# Patient Record
Sex: FEMALE | Race: WHITE | Employment: UNEMPLOYED | URBAN - NONMETROPOLITAN AREA
[De-identification: names, ages, dates, MRNs, and addresses within clinical notes are randomized per-mention and may not be internally consistent; named-entity substitution may affect disease eponyms.]

---

## 2019-11-13 ENCOUNTER — OFFICE VISIT (OUTPATIENT)
Dept: FAMILY MEDICINE | Facility: OTHER | Age: 39
End: 2019-11-13
Payer: MEDICAID

## 2019-11-13 ENCOUNTER — TELEPHONE (OUTPATIENT)
Dept: FAMILY MEDICINE | Facility: CLINIC | Age: 39
End: 2019-11-13

## 2019-11-13 VITALS
HEART RATE: 102 BPM | TEMPERATURE: 98.3 F | HEIGHT: 61 IN | WEIGHT: 129.19 LBS | OXYGEN SATURATION: 100 % | RESPIRATION RATE: 20 BRPM | BODY MASS INDEX: 24.39 KG/M2 | DIASTOLIC BLOOD PRESSURE: 80 MMHG | SYSTOLIC BLOOD PRESSURE: 132 MMHG

## 2019-11-13 DIAGNOSIS — F17.200 TOBACCO USE DISORDER: ICD-10-CM

## 2019-11-13 DIAGNOSIS — J06.9 UPPER RESPIRATORY TRACT INFECTION, UNSPECIFIED TYPE: Primary | ICD-10-CM

## 2019-11-13 LAB
FLUAV+FLUBV AG SPEC QL: NEGATIVE
FLUAV+FLUBV AG SPEC QL: NEGATIVE
SPECIMEN SOURCE: NORMAL

## 2019-11-13 PROCEDURE — 87804 INFLUENZA ASSAY W/OPTIC: CPT | Performed by: FAMILY MEDICINE

## 2019-11-13 PROCEDURE — 99203 OFFICE O/P NEW LOW 30 MIN: CPT | Performed by: FAMILY MEDICINE

## 2019-11-13 RX ORDER — CODEINE PHOSPHATE AND GUAIFENESIN 10; 100 MG/5ML; MG/5ML
2 SOLUTION ORAL EVERY 6 HOURS PRN
Qty: 120 ML | Refills: 0 | Status: SHIPPED | OUTPATIENT
Start: 2019-11-13 | End: 2020-01-09

## 2019-11-13 RX ORDER — CETIRIZINE HYDROCHLORIDE 10 MG/1
10 TABLET ORAL DAILY
Qty: 30 TABLET | Refills: 0 | Status: SHIPPED | OUTPATIENT
Start: 2019-11-13 | End: 2020-03-26

## 2019-11-13 RX ORDER — ALBUTEROL SULFATE 90 UG/1
2 AEROSOL, METERED RESPIRATORY (INHALATION) EVERY 4 HOURS PRN
Qty: 1 INHALER | Refills: 0 | Status: SHIPPED | OUTPATIENT
Start: 2019-11-13 | End: 2020-01-09

## 2019-11-13 ASSESSMENT — PAIN SCALES - GENERAL: PAINLEVEL: NO PAIN (0)

## 2019-11-13 ASSESSMENT — MIFFLIN-ST. JEOR: SCORE: 1198.37

## 2019-11-13 NOTE — TELEPHONE ENCOUNTER
Silvia Samaniego MD [facility]             Please let patient know that her flu test was negative.  Most likely she had viral infection and therefore antibiotic is not needed.  I sent a prescription of the inhaler, Zyrtec and cough medication to the pharmacy and please take them as prescribed.  Encouraged to drink a lot of water.  Strongly encouraged to stop smoking and recommend to follow-up for physical exam at her earliest convenience.  Cough medication is codeine therefore it can be sedated.  Encouraged her not to operate any type of machine when she is taking the codeine.  To the ER if develop breathing comfortably breathing difficulty.

## 2019-11-13 NOTE — PROGRESS NOTES
Subjective     Marily Fabian is a 39 year old female who presents to clinic today for the following health issues:    HPI   RESPIRATORY SYMPTOMS      Duration: 4 days     Description  nasal congestion, rhinorrhea, sore throat, cough, wheezing, chills, headache, fatigue/malaise and Ear fullness and dzzzyness    Severity: moderate    Accompanying signs and symptoms: None    History (predisposing factors):  none    Precipitating or alleviating factors: None    Therapies tried and outcome:  oral decongestant    Marily is here today for 4 day history is of cold symptoms.  Stated that she has not seen a provider for more than 10 years.  Been a smoker for many years; smoke about 1 ppd.  Stated that 4 days ago, she started having the sore throat followed by runny nose, nasal congestion, and coughing.  The ST is getting better but the runny nose and coughing are getting worse.  Cough is productive with clear and greenish production.  Cough is worse at night, it kept her up mot of the night last night.  Been having a runny nose and nasal congestion but no sneezing.  A lot of pressure in her ear without pain or drainage.  Also been having on and off frontal headache.  No sinus pain or pressure.  Stated that she has been wheezing with coughing and exertion.  Denied of CP or chest tightness sensation.  She feels little shortness of breath with exertion or coughing.  A lot of bodyache and having feeling hot and cold.  No nausea, vomiting, diarrhea constipation.  Been drinking a lot of water.  No history of asthma or COPD that she knows of.  Did not receive the flu vaccination for this season and is not up-to-date for the Tdap.  Stated that relative had URI symptoms over the weekend.  No other concerns.    No current outpatient medications on file.     Allergies   Allergen Reactions     Clarithromycin      biaxin     Ibuprofen        Reviewed and updated as needed this visit by Provider         Review of Systems   ROS COMP:  "Constitutional, HEENT, cardiovascular, pulmonary, gi and gu systems are negative, except as otherwise noted.      Objective    /80 (BP Location: Left arm, Patient Position: Sitting, Cuff Size: Adult Regular)   Pulse 102   Temp 98.3  F (36.8  C) (Temporal)   Resp 20   Ht 1.549 m (5' 1\")   Wt 58.6 kg (129 lb 3 oz)   LMP 11/05/2019   SpO2 100%   Breastfeeding No   BMI 24.41 kg/m    Body mass index is 24.41 kg/m .  Physical Exam   GENERAL: healthy, alert and no distress.  Speak in full sentences. Anxious  HENT: ear canals and TM's normal.  Nares are congested with clear drainage.  Oropharynx is pink and moist.  No tonsilar redness, exudate or hypertrophy.  No tender with palpation to the sinuses.  NECK: no adenopathy, supple, no lymphadenopathy.  RESP: Decreased breath sounds throughout with no wheezing or crackles.  No retraction or in respiratory distress.  CV: regular rate and rhythm, no murmur.  No leg swelling.  ABDOMEN: soft, non-tender and bowel sounds normal      Diagnostic Test Results:  Labs reviewed in Epic  Results for orders placed or performed in visit on 11/13/19   Influenza A/B antigen     Status: None   Result Value Ref Range    Influenza A/B Agn Specimen Nasal     Influenza A Negative NEG^Negative    Influenza B Negative NEG^Negative           Assessment & Plan       ICD-10-CM    1. Upper respiratory tract infection, unspecified type J06.9 Influenza A/B antigen   2. Tobacco use disorder F17.200      aMrily is here today for 4 day history of cold symptoms including runny nose, nasal congestion, sore throat, coughing with body aches.  She smokes a pack a day.  No known history of COPD or asthma.  Did not get her flu vaccination.  Vital sign was stable with O2 sat 100% at room air.  Did not look acutely sick in the office.  Physical exam with significant decreased breath sounds throughout with no wheezing or crackles.  Not in acute distress.  While waiting for the flu test, patient got " upset and left the room.  I tried to calm her down and have her to wait for flu test result but she determined to leave. I was planning to check RST if flu test was negative.  Clinical presentation and physical exam did not suggest of strep pharyngitis.  She refused to wait and ran out of the room.    The flu vaccination was negative.  Most likely to have a viral infection and antibiotic is not indicated.  Patient was informed about the lab results over the phone.  Started her with albuterol inhaler, Zyrtec and Robitussin-AC.  She was strongly encouraged to stop smoking.  Recommended to follow up if the symptoms are not getting better or getting worse in the next 3 to 4 days.  ER if develop breathing difficulty.  She was also encouraged to follow-up for physical/preventive care.  This message was relayed to the patient over the phone by nursing staff.      Tobacco Cessation:   reports that she has been smoking cigarettes. She has a 24.00 pack-year smoking history. She has never used smokeless tobacco.  Tobacco Cessation Action Plan: Information offered: Patient not interested at this time      Return in about 1 month (around 12/13/2019) for Physical Exam - with PCP.    Silvia Enrique Mai, MD  [facility]

## 2019-11-13 NOTE — Clinical Note
Please let patient know that her flu test was negative.  Most likely she had viral infection and therefore antibiotic is not needed.  I sent a prescription of the inhaler, Zyrtec and cough medication to the pharmacy and please take them as prescribed.  Encouraged to drink a lot of water.  Strongly encouraged to stop smoking and recommend to follow-up for physical exam at her earliest convenience.  Cough medication is codeine therefore it can be sedated.  Encouraged her not to operate any type of machine when she is taking the codeine.  To the ER if develop breathing comfortably breathing difficulty.

## 2019-12-12 ENCOUNTER — TELEPHONE (OUTPATIENT)
Dept: FAMILY MEDICINE | Facility: CLINIC | Age: 39
End: 2019-12-12

## 2019-12-12 NOTE — TELEPHONE ENCOUNTER
Panel Management Review      Patient has the following on her problem list: None      Composite cancer screening  Chart review shows that this patient is due/due soon for the following Pap Smear  Summary:    Patient is due/failing the following:   PAP and PHYSICAL    Action needed:   Patient needs office visit for physical.    Type of outreach:    Sent letter.    Questions for provider review:    None                                                                                                                                    Kira Nielsen CMA        Chart routed to Care Team .

## 2019-12-12 NOTE — LETTER
[address] [address]  [phone]        December 12, 2019    Marily Fabian  [address] [facility] [address]          Dear Marily,    We were looking through you chart and noticed that you haven't been seen in a while, we would like to schedule an office visit for an annual physical when you have time. Please give the clinic a call at [phone] and schedule at your earliest convienence.  Thank you so much, and we look forward to seeing you soon.             Sincerely,        Silvia Samaniego MD

## 2019-12-26 ENCOUNTER — TELEPHONE (OUTPATIENT)
Dept: FAMILY MEDICINE | Facility: OTHER | Age: 39
End: 2019-12-26

## 2019-12-26 NOTE — TELEPHONE ENCOUNTER
Reason for Call:  Same Day Appointment, Requested Provider:  ANY PROVIDER    PCP: No Ref-Primary, Physician    Reason for visit: sore throat/cough/runny nose/fever/wheezing    Duration of symptoms: 1 wk    Have you been treated for this in the past? No    Additional comments: pt has an appt tomorrow. Pt is very sick. Pts Aunt is wondering if a Provider could see pt today. Pts car broke down so not able [facility]    Can we leave a detailed message on this number? YES    Phone number patient can be reached at: [phone]    Best Time:     Call taken on 12/26/2019 at 9:41 AM by Nohemy Hager

## 2019-12-26 NOTE — TELEPHONE ENCOUNTER
Unfortunately, I do not have any openings at this time. I would recommend that the patient either be seen at [facility] or keep the appointment for tomorrow.     Sukh Qiu PA-C on 12/26/2019 at 12:43 PM

## 2019-12-26 NOTE — TELEPHONE ENCOUNTER
I called this patient with the following per Sukh Qiu PA-C:  Unfortunately, I do not have any openings at this time. I would recommend that the patient either be seen at [facility] care or keep the appointment for tomorrow. She stated she will keep her appointment tomorrow.

## 2020-01-09 ENCOUNTER — OFFICE VISIT (OUTPATIENT)
Dept: FAMILY MEDICINE | Facility: OTHER | Age: 40
End: 2020-01-09
Payer: COMMERCIAL

## 2020-01-09 VITALS
HEIGHT: 60 IN | DIASTOLIC BLOOD PRESSURE: 70 MMHG | WEIGHT: 127.1 LBS | OXYGEN SATURATION: 96 % | BODY MASS INDEX: 24.95 KG/M2 | SYSTOLIC BLOOD PRESSURE: 128 MMHG | TEMPERATURE: 98.9 F | HEART RATE: 88 BPM | RESPIRATION RATE: 20 BRPM

## 2020-01-09 DIAGNOSIS — F17.200 TOBACCO USE DISORDER: ICD-10-CM

## 2020-01-09 DIAGNOSIS — J20.9 ACUTE BRONCHITIS, UNSPECIFIED ORGANISM: Primary | ICD-10-CM

## 2020-01-09 DIAGNOSIS — J06.9 UPPER RESPIRATORY TRACT INFECTION, UNSPECIFIED TYPE: ICD-10-CM

## 2020-01-09 PROCEDURE — 99213 OFFICE O/P EST LOW 20 MIN: CPT | Performed by: PHYSICIAN ASSISTANT

## 2020-01-09 RX ORDER — FLUTICASONE PROPIONATE AND SALMETEROL XINAFOATE 115; 21 UG/1; UG/1
2 AEROSOL, METERED RESPIRATORY (INHALATION) 2 TIMES DAILY
Qty: 1 INHALER | Refills: 1 | Status: SHIPPED | OUTPATIENT
Start: 2020-01-09 | End: 2020-03-05

## 2020-01-09 RX ORDER — PREDNISONE 20 MG/1
40 TABLET ORAL DAILY
Qty: 10 TABLET | Refills: 0 | Status: SHIPPED | OUTPATIENT
Start: 2020-01-09 | End: 2020-01-14

## 2020-01-09 RX ORDER — ALBUTEROL SULFATE 90 UG/1
2 AEROSOL, METERED RESPIRATORY (INHALATION) EVERY 4 HOURS PRN
Qty: 1 INHALER | Refills: 0 | Status: SHIPPED | OUTPATIENT
Start: 2020-01-09 | End: 2020-03-26

## 2020-01-09 ASSESSMENT — MIFFLIN-ST. JEOR: SCORE: 1176.99

## 2020-01-09 ASSESSMENT — PAIN SCALES - GENERAL: PAINLEVEL: SEVERE PAIN (7)

## 2020-01-09 NOTE — PROGRESS NOTES
"Subjective     Marily Fabian is a 39 year old female who presents to clinic today for the following health issues:    HPI   Acute Illness   Acute illness concerns: cold symptoms  Onset: 2 1/2 weeks    Fever: no    Chills/Sweats: YES    Headache (location?): YES    Sinus Pressure:YES    Conjunctivitis:  no    Ear Pain: no    Rhinorrhea: no    Congestion: YES    Sore Throat: YES     Cough: YES-productive of green/yellow sputum    Wheeze: YES    Decreased Appetite: YES    Nausea: no    Vomiting: no    Diarrhea:  YES    Dysuria/Freq.: YES    Fatigue/Achiness: YES    Sick/Strep Exposure: YES     Therapies Tried and outcome: Robitussin, Mucinex, no relief    Reviewed and updated as needed this visit by Provider         Review of Systems   ROS COMP: Constitutional, HEENT, cardiovascular, pulmonary, gi and gu systems are negative, except as otherwise noted.      Objective    /70 (BP Location: Left arm, Patient Position: Chair, Cuff Size: Adult Regular)   Pulse 88   Temp 98.9  F (37.2  C) (Oral)   Resp 20   Ht 1.53 m (5' 0.25\")   Wt 57.7 kg (127 lb 1.6 oz)   SpO2 96%   BMI 24.62 kg/m    Body mass index is 24.62 kg/m .  Physical Exam   GENERAL: healthy, alert and no distress  EYES: Eyes grossly normal to inspection, PERRL and conjunctivae and sclerae normal  HENT: ear canals and TM's normal, nose erythematous, no tenderness to percussion over max/front sinuses and mouth without ulcers or lesions, post nasal drip, no teeth present  NECK: no adenopathy, no asymmetry, masses, or scars and thyroid normal to palpation  RESP: lungs clear to auscultation, poor airflow, tight sounding airways, coughing during visit  CV: regular rate and rhythm, normal S1 S2, no S3 or S4, no murmur, click or rub, no peripheral edema and peripheral pulses strong  MS: no gross musculoskeletal defects noted, no edema    Diagnostic Test Results:  Labs reviewed in Epic        Assessment & Plan       ICD-10-CM    1. Acute bronchitis, " unspecified organism J20.9 predniSONE (DELTASONE) 20 MG tablet     fluticasone-salmeterol (ADVAIR-HFA) 115-21 MCG/ACT inhaler   2. Upper respiratory tract infection, unspecified type J06.9 albuterol (PROAIR HFA/PROVENTIL HFA/VENTOLIN HFA) 108 (90 Base) MCG/ACT inhaler   3. Tobacco use disorder F17.200       Suspect that the patient is experiencing exacerbation of chronic bronchitis given history of smoking. No previous COPD diagnosis given nor has the patient undergone PFT. Will provide a prednisone burst for immediate relief and have the patient continue combination LABA/ICS for long term relief. She said that she used up an albuterol inhaler in past 1.5 months. Will refill today. Reviewed educational material and sent copy home. Call if not improving.     Return in about 6 months (around 7/9/2020) for Return for scheduled annual checkup with PCP.    Sukh Qiu PA-C  [facility]

## 2020-01-09 NOTE — PATIENT INSTRUCTIONS
Patient Education     Acute Bronchitis  Your healthcare provider has told you that you have acute bronchitis. Bronchitis is infection or inflammation of the bronchial tubes (airways in the lungs). Normally, air moves easily in and out of the airways. Bronchitis narrows the airways, making it harder for air to flow in and out of the lungs. This causes symptoms such as shortness of breath, coughing up yellow or green mucus, and wheezing. Bronchitis can be acute or chronic. Acute means the condition comes on quickly and goes away in a short time, usually within 3 to 10 days. Chronic means a condition lasts a long time and often comes back.    What causes acute bronchitis?  Acute bronchitis almost always starts as a viral respiratory infection, such as a cold or the flu. Certain factors make it more likely for a cold or flu to turn into bronchitis. These include being very young, being elderly, having a heart or lung problem, or having a weak immune system. Cigarette smoking also makes bronchitis more likely.  When bronchitis develops, the airways become swollen. The airways may also become infected with bacteria. This is known as a secondary infection.  Diagnosing acute bronchitis  Your healthcare provider will examine you and ask about your symptoms and health history. You may also have a sputum culture to test the fluid in your lungs. Chest X-rays may be done to look for infection in the lungs.  Treating acute bronchitis  Bronchitis usually clears up as the cold or flu goes away. You can help feel better faster by doing the following:    Take medicine as directed. You may be told to take ibuprofen or other over-the-counter medicines. These help relieve inflammation in your bronchial tubes. Your healthcare provider may prescribe an inhaler to help open up the bronchial tubes. Most of the time, acute bronchitis is caused by a viral infection. Antibiotics are usually not prescribed for viral infections.    Drink  plenty of fluids, such as water, juice, or warm soup. Fluids loosen mucus so that you can cough it up. This helps you breathe more easily. Fluids also prevent dehydration.    Make sure you get plenty of rest.    Do not smoke. Do not allow anyone else to smoke in your home.  Recovery and follow-up  Follow up with your doctor as you are told. You will likely feel better in a week or two. But a dry cough can linger beyond that time. Let your doctor know if you still have symptoms (other than a dry cough) after 2 weeks, or if you re prone to getting bronchial infections. Take steps to protect yourself from future infections. These steps include stopping smoking and avoiding tobacco smoke, washing your hands often, and getting a yearly flu shot.  When to call your healthcare provider  Call the healthcare provider if you have any of the following:    Fever of 100.4 F (38.0 C) or higher, or as advised    Symptoms that get worse, or new symptoms    Trouble breathing    Symptoms that don t start to improve within a week, or within 3 days of taking antibiotics   Date Last Reviewed: 12/1/2016 2000-2019 [employer]. [address] [place] [address], [place] [address]. All rights reserved. This information is not intended as a substitute for professional medical care. Always follow your healthcare professional's instructions.

## 2020-01-09 NOTE — NURSING NOTE
Health Maintenance Due   Topic Date Due     PREVENTIVE CARE VISIT  1980     DTAP/TDAP/TD IMMUNIZATION (1 - Tdap) 07/14/1991     HIV SCREENING  07/14/1995     PNEUMOCOCCAL IMMUNIZATION 19-64 MEDIUM RISK (1 of 1 - PPSV23) 07/14/1999     HPV  07/14/2001     PAP  07/14/2005     INFLUENZA VACCINE (1) 09/01/2019     PHQ-2  01/01/2020     Chelsea CAMPOS LPN

## 2020-01-14 ENCOUNTER — OFFICE VISIT (OUTPATIENT)
Dept: FAMILY MEDICINE | Facility: OTHER | Age: 40
End: 2020-01-14
Payer: COMMERCIAL

## 2020-01-14 VITALS
TEMPERATURE: 98.8 F | OXYGEN SATURATION: 96 % | SYSTOLIC BLOOD PRESSURE: 130 MMHG | WEIGHT: 125 LBS | HEART RATE: 84 BPM | DIASTOLIC BLOOD PRESSURE: 80 MMHG | BODY MASS INDEX: 24.21 KG/M2 | RESPIRATION RATE: 20 BRPM

## 2020-01-14 DIAGNOSIS — J20.9 ACUTE BRONCHITIS, UNSPECIFIED ORGANISM: Primary | ICD-10-CM

## 2020-01-14 DIAGNOSIS — F17.200 TOBACCO USE DISORDER: ICD-10-CM

## 2020-01-14 PROCEDURE — 99213 OFFICE O/P EST LOW 20 MIN: CPT | Performed by: PHYSICIAN ASSISTANT

## 2020-01-14 RX ORDER — BUDESONIDE AND FORMOTEROL FUMARATE DIHYDRATE 160; 4.5 UG/1; UG/1
2 AEROSOL RESPIRATORY (INHALATION) 2 TIMES DAILY
Qty: 1 INHALER | Refills: 0 | Status: SHIPPED | OUTPATIENT
Start: 2020-01-14 | End: 2021-08-31

## 2020-01-14 RX ORDER — DEXTROMETHORPHAN POLISTIREX 30 MG/5ML
60 SUSPENSION ORAL 2 TIMES DAILY
Qty: 200 ML | Refills: 0 | Status: SHIPPED | OUTPATIENT
Start: 2020-01-14 | End: 2020-02-27

## 2020-01-14 ASSESSMENT — PAIN SCALES - GENERAL: PAINLEVEL: EXTREME PAIN (9)

## 2020-01-14 NOTE — PROGRESS NOTES
Subjective     Marily Fabian is a 39 year old female who presents to clinic today for the following health issues:    HPI   Acute Illness   Acute illness concerns: cold symptoms  Onset: recheck    Fever: no    Chills/Sweats: YES    Headache (location?): no    Sinus Pressure:no    Conjunctivitis:  YES: left    Ear Pain: no    Rhinorrhea: no    Congestion: YES    Sore Throat: YES     Cough: YES-productive of green sputum    Wheeze: YES    Decreased Appetite: YES    Nausea: no    Vomiting: no    Diarrhea:  no    Dysuria/Freq.: no    Fatigue/Achiness: YES    Sick/Strep Exposure: no     Therapies Tried and outcome: prednisone, Mucinex, Albuterol, Robitussin; no relief    Patient is a 39 year old female who presents for follow up of URI. She was seen last week and treated with prednisone and ICS/LABA for suspected bronchitis. Patient states that she completed the prednisone, but was not able to tolerate the Advair as it made her mouth hurt. She feels that her cough has worsened and she has been unable to sleep at night. See associated symptoms above.       Reviewed and updated as needed this visit by Provider         Review of Systems   ROS COMP: Constitutional, HEENT, cardiovascular, pulmonary, gi and gu systems are negative, except as otherwise noted.      Objective    /80 (BP Location: Right arm, Patient Position: Chair, Cuff Size: Adult Regular)   Pulse 84   Temp 98.8  F (37.1  C) (Oral)   Resp 20   Wt 56.7 kg (125 lb)   SpO2 96%   BMI 24.21 kg/m    Body mass index is 24.21 kg/m .  Physical Exam   GENERAL: healthy, alert and no distress  NECK: no adenopathy, no asymmetry, masses, or scars and thyroid normal to palpation  RESP: lungs with rhonchi and coughing during exam  CV: regular rate and rhythm, normal S1 S2, no S3 or S4, no murmur, click or rub, no peripheral edema and peripheral pulses strong  MS: no gross musculoskeletal defects noted, no edema    Diagnostic Test Results:  Labs reviewed in Epic         Assessment & Plan       ICD-10-CM    1. Acute bronchitis, unspecified organism J20.9 amoxicillin-clavulanate (AUGMENTIN) 875-125 MG tablet     dextromethorphan (DELSYM) 30 MG/5ML liquid   2. Tobacco use disorder F17.200 budesonide-formoterol (SYMBICORT) 160-4.5 MCG/ACT Inhaler     dextromethorphan (DELSYM) 30 MG/5ML liquid     Patient will start antibiotic twice daily until gone. I have sent out an alternative ICS/LABA inhaler for her to use to help improve COPD. Advised her to quit smoking, she is trying to limit amount of cigarettes she smokes each day. Educational information sent home with the patient.     Return in about 6 months (around 7/14/2020).    Sukh Qiu PA-C  [facility]

## 2020-01-14 NOTE — NURSING NOTE
Health Maintenance Due   Topic Date Due     PREVENTIVE CARE VISIT  1980     DTAP/TDAP/TD IMMUNIZATION (1 - Tdap) 07/14/1991     HIV SCREENING  07/14/1995     PNEUMOCOCCAL IMMUNIZATION 19-64 MEDIUM RISK (1 of 1 - PPSV23) 07/14/1999     HPV  07/14/2001     PAP  07/14/2005     INFLUENZA VACCINE (1) 09/01/2019     Chelsea CAMPOS LPN

## 2020-01-14 NOTE — PATIENT INSTRUCTIONS
Patient Education       Home care    Take the full course of antibiotics as instructed. Do not stop taking them, even when you feel better.    Drink plenty of water, hot tea, and other liquids. This may help thin nasal mucus. It also may help your sinuses drain fluids.    Heat may help soothe painful areas of your face. Use a towel soaked in hot water. Or,  the shower and direct the warm spray onto your face. Using a vaporizer along with a menthol rub at night may also help soothe symptoms.     An expectorant with guaifenesin may help thin nasal mucus and help your sinuses drain fluids.    You can use an over-the-counter decongestant, unless a similar medicine was prescribed to you. Nasal sprays work the fastest. Use one that contains phenylephrine. First blow your nose gently. Then use the spray. Do not use these medicines more often than directed on the label. If you do, your symptoms may get worse. You may also take pills that contain pseudoephedrine. Don t use products that combine multiple medicines. This is because side effects may be increased. Read labels. You can also ask the pharmacist for help. (People with high blood pressure should not use decongestants. They can raise blood pressure.)    Over-the-counter antihistamines may help if allergies contributed to your sinusitis.      Do not use nasal rinses or irrigation during an acute sinus infection, unless your healthcare provider tells you to. Rinsing may spread the infection to other areas in your sinuses.    Use acetaminophen or ibuprofen to control pain, unless another pain medicine was prescribed to you. If you have chronic liver or kidney disease or ever had a stomach ulcer, talk with your healthcare provider before using these medicines. (Aspirin should never be taken by anyone under age 18 who is ill with a fever. It may cause severe liver damage.)    Don't smoke. This can make symptoms worse.  Follow-up care  Follow up with your  healthcare provider or our staff if you are not better in 1 week.  When to seek medical advice  Call your healthcare provider if any of these occur:    Facial pain or headache that gets worse    Stiff neck    Unusual drowsiness or confusion    Swelling of your forehead or eyelids    Vision problems, such as blurred or double vision    Fever of 100.4 F (38 C) or higher, or as directed by your healthcare provider    Seizure    Breathing problems    Symptoms don't go away in 10 days  Prevention  Here are steps you can take to help prevent an infection:    Keep good hand washing habits.    Don t have close contact with people who have sore throats, colds, or other upper respiratory infections.    Don t smoke, and stay away from secondhand smoke.    Stay up to date with of your vaccines.  Date Last Reviewed: 11/1/2017 2000-2019 [employer]. [address] [place] [address], [place] [address]. All rights reserved. This information is not intended as a substitute for professional medical care. Always follow your healthcare professional's instructions.

## 2020-01-24 ENCOUNTER — TELEPHONE (OUTPATIENT)
Dept: FAMILY MEDICINE | Facility: OTHER | Age: 40
End: 2020-01-24

## 2020-01-24 DIAGNOSIS — B37.31 YEAST INFECTION OF THE VAGINA: Primary | ICD-10-CM

## 2020-01-24 RX ORDER — FLUCONAZOLE 150 MG/1
150 TABLET ORAL ONCE
Qty: 1 TABLET | Refills: 0 | Status: SHIPPED | OUTPATIENT
Start: 2020-01-24 | End: 2020-02-27

## 2020-01-24 NOTE — TELEPHONE ENCOUNTER
Reason for Call:  Medication or medication refill:    Do you use a [facility]?  Name of the pharmacy and phone number for the current request:  [facility] - [phone]    Name of the medication requested: treatment for yeast infection    Other request: was on antibiotic and now has yeast infection    Can we leave a detailed message on this number? YES    Phone number patient can be reached at: Cell number on file:    Telephone Information:   Mobile [phone]       Best Time:     Call taken on 1/24/2020 at 11:57 AM by Juliann Barakat

## 2020-01-28 ENCOUNTER — TELEPHONE (OUTPATIENT)
Dept: FAMILY MEDICINE | Facility: OTHER | Age: 40
End: 2020-01-28

## 2020-01-28 NOTE — TELEPHONE ENCOUNTER
Panel Management Review      Patient has the following on her problem list:       Composite cancer screening  Chart review shows that this patient is due/due soon for the following   Summary:    Patient is due/failing the following:   PAP and PHYSICAL    Action needed:   Patient needs office visit for physical and pap.    Type of outreach:    Phone, spoke to patient.  appointment scheduled    Questions for provider review:    None                                                                                                                                    Chelsea CAMPOS LPN       Chart routed to Chelsea CAMPOS LPN   .

## 2020-02-15 NOTE — PROGRESS NOTES
SUBJECTIVE:       HPI: Marily Fabian is a 39 year old  who presents today for abnormal uterine bleeding and pelvic pain. Pain is primarily on the left side that is throbbing and constant. Minimally relieved with ibuprofen. History of regular menses until recently. In the last month, period heavier than usual and one episode intermenstrual, light bleeding. +Occasional postcoital bleeding.  Also notes history of retained tampon last month, that she ultimately removed. Unknown length of time it was in place.     For hormonal contraception has previously used Depo >10 years ago. Nothing since.    She denies dysmenorrhea, dysparunia. She denies fevers/chills, nausea/vomiting, abdominal pain or bloating. Denies dysuria, hematuria, constipation or diarrhea.      Ob Hx:  s/p SVDx2.      Gyn Hx: Patient's last menstrual period was 2020.    Patient is sexually active. One male partner   Last pap was possibly in 2017 in [place], [facility] hx LSIL 2000 +HPV effect s/p colposcopy   STI history denies  Using tubal ligation for contraception.   STD testing offered?  Declined  Family history of gyn-related malignancies: denies         reports that she has been smoking cigarettes. She has a 12.00 pack-year smoking history. She has never used smokeless tobacco.      Today's PHQ-2 Score:   PHQ-2 (  [employer]) 2020   Q1: Little interest or pleasure in doing things 0   Q2: Feeling down, depressed or hopeless 0   PHQ-2 Score 0     Today's PHQ-9 Score: No flowsheet data found.  Today's BRETT-7 Score: No flowsheet data found.    Problem list and histories reviewed & adjusted, as indicated.  Additional history: as documented.    Patient Active Problem List   Diagnosis     Tobacco use disorder     Past Surgical History:   Procedure Laterality Date     C APPENDECTOMY  2000     C DRAIN OVARIAN ABSCESS,ABD APPRCH  2001     CONIZATION CERVIX,LOOP ELECTRD  2001     TUBAL LIGATION        Social History      Tobacco Use     Smoking status: Current Every Day Smoker     Packs/day: 0.50     Years: 24.00     Pack years: 12.00     Types: Cigarettes     Smokeless tobacco: Never Used   Substance Use Topics     Alcohol use: Yes     Comment: weekends       Problem (# of Occurrences) Relation (Name,Age of Onset)    Attention Deficit Disorder (2) Brother, Son    Cancer (2) Maternal Grandmother: lung cancer, Paternal Grandmother    Depression (6) Mother, Sister, Daughter, Sister, Sister, Son    Diabetes (1) Paternal Grandmother: brain and lung cancer    Hypertension (2) Mother, Maternal Grandfather    Mental Illness (1) Mother: anxiety    Unknown/Adopted (4) Father, Maternal Grandmother, Maternal Grandfather, Paternal Grandfather            albuterol (PROAIR HFA/PROVENTIL HFA/VENTOLIN HFA) 108 (90 Base) MCG/ACT inhaler, Inhale 2 puffs into the lungs every 4 hours as needed for shortness of breath / dyspnea or wheezing  fluticasone-salmeterol (ADVAIR-HFA) 115-21 MCG/ACT inhaler, Inhale 2 puffs into the lungs 2 times daily  [] amoxicillin-clavulanate (AUGMENTIN) 875-125 MG tablet, Take 1 tablet by mouth 2 times daily for 10 days  budesonide-formoterol (SYMBICORT) 160-4.5 MCG/ACT Inhaler, Inhale 2 puffs into the lungs 2 times daily (Patient not taking: Reported on 2020)  cetirizine (ZYRTEC) 10 MG tablet, Take 1 tablet (10 mg) by mouth daily (Patient not taking: Reported on 2020)  [] dextromethorphan (DELSYM) 30 MG/5ML liquid, Take 10 mLs (60 mg) by mouth 2 times daily for 10 days  Dextromethorphan-guaiFENesin (ROBITUSSIN DM PO), As needed  [] fluconazole (DIFLUCAN) 150 MG tablet, Take 1 tablet (150 mg) by mouth once for 1 dose  Pseudoephedrine-guaiFENesin (MUCINEX D PO), as needed    No current facility-administered medications on file prior to visit.     Allergies   Allergen Reactions     Clarithromycin      biaxin     Ibuprofen        ROS:  10 Point review of systems negative other noted above in  HPI    OBJECTIVE:     BP (!) 160/100   Pulse 88   Wt 56 kg (123 lb 8 oz)   LMP 2020   BMI 23.92 kg/m    Body mass index is 23.92 kg/m .      Gen: Alert, oriented, appropriately interactive, NAD  Chest: Symmetrical, unlabored breathing  Abdomen: soft, non tender, non distended, no masses, no hernias. No inguinal lymphadenopathy.   External genitalia: no lesions; normal appearing external genitalia, bartholins glands, urethra, skenes glands  Vagina: no masses or lesions or discharge, normally rugated.  Cervix: no masses or lesions or discharge   Bimanual exam:   Nontender pelvic floor muscles  Urethra: nontender   Bladder: nontender and without massess, well supported   Uterus: midline, retroflexed, small, mobile  no masses, non-tender  Adnexa: no masses or tenderness appreciated   No cervical motion tenderness  MSK: normal gait, symmetric movements UE & LE  Lower extremities: non-tender, no edema      In-Clinic Test Results:  Results for orders placed or performed in visit on 20 (from the past 24 hour(s))   Wet prep   Result Value Ref Range    Specimen Description Vagina     Wet Prep No Trichomonas seen     Wet Prep No yeast seen     Wet Prep Few  PMNs seen       Wet Prep Many  Clue cells seen   (A)        ASSESSMENT/PLAN:                                                      Marily Fabian is a 39 year old  who presents today for abnormal uterine bleeding and pelvic pain.       ICD-10-CM    1. Abnormal uterine bleeding N93.9 US Pelvic Complete w Transvaginal   2. Pelvic pain in female R10.2 US Pelvic Complete w Transvaginal   3. Vaginal discharge N89.8 Wet prep       AUB x1 cycle, now light flow. Dark red blood in vault, however, no abnormal/concerning findings on exam. No pain palpated. Pap w/ HR HPV deferred until no longer bleeding. Wet prep collected for vaginal discharge and odor. Pelvic ultrasound for new-onset, persistent pelvic pain. Discussed options for management of AUB, including  hormonal contraceptives. Not candidate for CHCs, but offered Mirena IUD, Nexplanon, Depo Provera and POPs. Will think about and decide at f/u visit. Will follow results and treat as indicated. Requested RTO 2-3 weeks to discuss results and obtain pap smear.     Elevated BP in office x2, high anxiety with medical visits. Recommend follow-up with Primary care provider to investigate further.    Release of records from [place] signed as well.      Pratibha Cooper,   [facility]

## 2020-02-17 ENCOUNTER — TELEPHONE (OUTPATIENT)
Facility: CLINIC | Age: 40
End: 2020-02-17

## 2020-02-17 ENCOUNTER — OFFICE VISIT (OUTPATIENT)
Dept: OBGYN | Facility: CLINIC | Age: 40
End: 2020-02-17
Payer: COMMERCIAL

## 2020-02-17 VITALS
DIASTOLIC BLOOD PRESSURE: 100 MMHG | HEART RATE: 88 BPM | BODY MASS INDEX: 23.92 KG/M2 | WEIGHT: 123.5 LBS | SYSTOLIC BLOOD PRESSURE: 160 MMHG

## 2020-02-17 DIAGNOSIS — N93.9 ABNORMAL UTERINE BLEEDING: Primary | ICD-10-CM

## 2020-02-17 DIAGNOSIS — N89.8 VAGINAL DISCHARGE: ICD-10-CM

## 2020-02-17 DIAGNOSIS — R10.2 PELVIC PAIN IN FEMALE: ICD-10-CM

## 2020-02-17 DIAGNOSIS — N76.0 BACTERIAL VAGINITIS: Primary | ICD-10-CM

## 2020-02-17 DIAGNOSIS — B96.89 BACTERIAL VAGINITIS: Primary | ICD-10-CM

## 2020-02-17 LAB
SPECIMEN SOURCE: ABNORMAL
WET PREP SPEC: ABNORMAL

## 2020-02-17 PROCEDURE — 87210 SMEAR WET MOUNT SALINE/INK: CPT | Performed by: OBSTETRICS & GYNECOLOGY

## 2020-02-17 PROCEDURE — 99203 OFFICE O/P NEW LOW 30 MIN: CPT | Performed by: OBSTETRICS & GYNECOLOGY

## 2020-02-17 RX ORDER — METRONIDAZOLE 500 MG/1
500 TABLET ORAL 2 TIMES DAILY
Qty: 14 TABLET | Refills: 0 | Status: SHIPPED | OUTPATIENT
Start: 2020-02-17 | End: 2020-02-27

## 2020-02-20 ENCOUNTER — HOSPITAL ENCOUNTER (OUTPATIENT)
Dept: ULTRASOUND IMAGING | Facility: CLINIC | Age: 40
Discharge: HOME | End: 2020-02-20
Attending: OBSTETRICS & GYNECOLOGY | Admitting: OBSTETRICS & GYNECOLOGY
Payer: COMMERCIAL

## 2020-02-20 DIAGNOSIS — N93.9 ABNORMAL UTERINE BLEEDING: ICD-10-CM

## 2020-02-20 DIAGNOSIS — R10.2 PELVIC PAIN IN FEMALE: ICD-10-CM

## 2020-02-20 PROCEDURE — 76830 TRANSVAGINAL US NON-OB: CPT

## 2020-02-27 ENCOUNTER — OFFICE VISIT (OUTPATIENT)
Dept: FAMILY MEDICINE | Facility: OTHER | Age: 40
End: 2020-02-27
Payer: COMMERCIAL

## 2020-02-27 VITALS
RESPIRATION RATE: 20 BRPM | HEART RATE: 80 BPM | SYSTOLIC BLOOD PRESSURE: 158 MMHG | OXYGEN SATURATION: 99 % | BODY MASS INDEX: 26.32 KG/M2 | WEIGHT: 135.9 LBS | DIASTOLIC BLOOD PRESSURE: 100 MMHG | TEMPERATURE: 98.7 F

## 2020-02-27 DIAGNOSIS — H90.11 CONDUCTIVE HEARING LOSS OF RIGHT EAR WITH UNRESTRICTED HEARING OF LEFT EAR: Primary | ICD-10-CM

## 2020-02-27 DIAGNOSIS — S09.90XA INJURY OF HEAD, INITIAL ENCOUNTER: ICD-10-CM

## 2020-02-27 DIAGNOSIS — I10 ESSENTIAL HYPERTENSION: ICD-10-CM

## 2020-02-27 DIAGNOSIS — G47.9 SLEEPING DIFFICULTIES: ICD-10-CM

## 2020-02-27 PROCEDURE — 99214 OFFICE O/P EST MOD 30 MIN: CPT | Performed by: PHYSICIAN ASSISTANT

## 2020-02-27 RX ORDER — AMITRIPTYLINE HYDROCHLORIDE 10 MG/1
10 TABLET ORAL AT BEDTIME
Qty: 30 TABLET | Refills: 0 | Status: SHIPPED | OUTPATIENT
Start: 2020-02-27 | End: 2020-03-27

## 2020-02-27 RX ORDER — LORATADINE 10 MG/1
10 TABLET ORAL DAILY
COMMUNITY
End: 2021-08-31

## 2020-02-27 RX ORDER — LISINOPRIL 10 MG/1
10 TABLET ORAL DAILY
Qty: 30 TABLET | Refills: 1 | Status: SHIPPED | OUTPATIENT
Start: 2020-02-27 | End: 2020-03-26

## 2020-02-27 ASSESSMENT — PAIN SCALES - GENERAL: PAINLEVEL: NO PAIN (0)

## 2020-02-27 NOTE — PROGRESS NOTES
Subjective     Marily Fabian is a 39 year old female who presents to clinic today for the following health issues:    HPI   Concern - fell about 2 weeks ago  Onset: 2 weeks ago    Description:   Fell and injury to head, right eye and right ear    Intensity: severe    Progression of Symptoms:  worsening    Accompanying Signs & Symptoms:  Headache, ringing in her ears    Previous history of similar problem:   no    Precipitating factors:   Worsened by: not taking ibuprofen    Alleviating factors:  Improved by: ibuprofen    Therapies Tried and outcome: ibuprofen, clean ears with shower head; no relief    Patient is a 39 year old female who presents today with concerns about ringing and decreased hearing in her right ear. This occurred following a fall 2 weeks ago which resulted in striking the right side of her head against a metal rail. She said that she had consumed quite a bit of alcohol at that time and has since abstained from any further consumption. 12 days sober. She did not seek medical care following the fall and denies LOC, changes in vision or hearing, nausea/vomiting or confusion immediately following. She does report that the side of the head that was struck is quite tender and more noticeable in the AM. Ibuprofen has been helpful at managing this pain/discomfort. Over the past several days she has been experiencing a ringing in her right ear and difficulty hearing. She also says that she needs to keep a cotton ball in the ear canal as talking or other sounds come off as quite distorted. She denies dizziness/vertigo, changes to vision, confusion, nausea/vomiting, trouble swallowing, weakness or numbness, tingling or swelling.     Reviewed and updated as needed this visit by Provider         Review of Systems   ROS COMP: Constitutional, HEENT, cardiovascular, pulmonary, gi and gu systems are negative, except as otherwise noted.      Objective    BP (!) 158/100 (BP Location: Right arm, Patient Position:  Chair, Cuff Size: Adult Regular)   Pulse 80   Temp 98.7  F (37.1  C) (Oral)   Resp 20   Wt 61.6 kg (135 lb 14.4 oz)   LMP 02/02/2020   SpO2 99%   BMI 26.32 kg/m    Body mass index is 26.32 kg/m .  Physical Exam   GENERAL: healthy, alert and no distress  EYES: Eyes grossly normal to inspection, PERRL and conjunctivae and sclerae normal, EOM  HENT: ear canals and TM's normal, no evidence of hematoma or bulge, nose and mouth without ulcers or lesions  NECK: no adenopathy, no asymmetry, masses, or scars and thyroid normal to palpation  RESP: lungs clear to auscultation - no rales, rhonchi or wheezes  CV: regular rate and rhythm, normal S1 S2, no S3 or S4, no murmur, click or rub, no peripheral edema and peripheral pulses strong  ABDOMEN: soft, nontender, no hepatosplenomegaly, no masses and bowel sounds normal  MS: no gross musculoskeletal defects noted, no edema, mild tenderness to palpation along the right temporal region of cranium, no bony step or crepitus   NEURO: Normal strength and tone, mentation intact and speech normal, negative rhomberg  BACK: no CVA tenderness, no paralumbar tenderness  PSYCH: mentation appears normal, affect normal/bright    Diagnostic Test Results:  Labs reviewed in Epic        Assessment & Plan     1. Conductive hearing loss of right ear with unrestricted hearing of left ear  Initial injury was 2 weeks ago, I am less suspicious of bleed given normal exam and grossly normal history. Cause of hearing loss unclear, will have patient undergo testing and follow up with ENT.   - AUDIOLOGY ADULT REFERRAL; Future    2. Injury of head, initial encounter  Discussed post concussion syndrome with patient and what alarm symptoms to monitor for. Educational information sent home.   - AUDIOLOGY ADULT REFERRAL; Future    3. Essential hypertension  BP was elevated today, patient will start lisinopril and return for recheck in 1 month. Reviewed possible adverse effects with patient.   - lisinopril  (ZESTRIL) 10 MG tablet; Take 1 tablet (10 mg) by mouth daily  Dispense: 30 tablet; Refill: 1    4. Sleeping difficulties  She has tried trazodone in the past and did not tolerate the medication. Will try amitriptyline, cautioned against use of alcohol while taking this medication. Reviewed possible adverse effects. Will assess benefit at 1 month follow up.   - amitriptyline (ELAVIL) 10 MG tablet; Take 1 tablet (10 mg) by mouth At Bedtime  Dispense: 30 tablet; Refill: 0    Return in about 1 month (around 3/27/2020) for Medication Recheck.    Sukh Qiu PA-C  [facility]

## 2020-02-27 NOTE — NURSING NOTE
Health Maintenance Due   Topic Date Due     PREVENTIVE CARE VISIT  1980     DTAP/TDAP/TD IMMUNIZATION (1 - Tdap) 07/14/1991     HIV SCREENING  07/14/1995     PNEUMOCOCCAL IMMUNIZATION 19-64 MEDIUM RISK (1 of 1 - PPSV23) 07/14/1999     PAP  07/14/2001     INFLUENZA VACCINE (1) 09/01/2019     Chelsea CAMPOS LPN      Appointment schedule for pap smear on 3-2-2020.

## 2020-02-27 NOTE — PATIENT INSTRUCTIONS
Patient Education     Concussion    A concussion is a type of brain injury. It can be caused by a direct hit or blow to the head, neck, face, or body. The force of the blow makes the head and brain shake quickly back and forth. In some cases you may lose consciousness. Depending on the severity of the blow, it will take from a few hours up to a few days to get better. Sometimes symptoms may last a few months or longer. This is called post-concussion syndrome.  At first, you may have a headache, nausea, vomiting, or dizziness. You may also have problems concentrating or remembering things. This is normal.  Symptoms should get better as the hours and days go by. Symptoms that get worse could be a sign of a more serious brain injury. This might be a bruise or bleeding in the brain. That s why it s important to watch for the warning signs listed below.  School-age children are more at risk for symptoms that don t go away after a concussion. They should be watched very closely.   Home care  If your injury is mild and there are no serious signs or symptoms, your healthcare provider may recommend that you be watched at home. If there is evidence that the injury is more serious, you will be watched in the hospital. Follow these tips to help care for yourself at home:    After a concussion, your healthcare provider may recommend that a family member or friend watched you for 12 to 24 hours. They may be told to wake you every few hours during sleep to check for the signs below.    If your face or scalp swells, apply an ice pack for 20 minutes every 1 to 2 hours. Do this until the swelling starts to go down. To make an ice pack, put ice cubes in a plastic bag that seals at the top. Wrap the bag in a clean, thin towel or cloth. Never put ice or an ice pack directly on the skin.    You may use acetaminophen to control pain, unless another pain medicine was prescribed. Don't use aspirin or ibuprofen after a head injury. If you  have long-lasting (chronic) liver or kidney disease, talk with your healthcare provider before using these medicines. Also talk with your provider if you ever had a stomach ulcer or gastrointestinal bleeding.    For the next 24 hours:  ? Don t drink alcohol or take sedatives or medicines that make you sleepy.  ? Don t drive or operate machinery.  ? Don't do anything strenuous. Don t lift or strain.    Don t return right away to sports or to any activity where you could hit your head. Wait until all symptoms are gone and you have been cleared by your healthcare provider. Having a second head injury before you fully recover from the first one can lead to serious brain injury.    After a few days, it s OK to go back to your normal daily activities. But don t do anything that could cause your head to be hit again.  Follow-up care  Follow up with your healthcare provider in 1 week, or as directed.  A radiologist will review any X-rays or CT scans that were taken. You will be told of any new findings that may affect your care.  When to seek medical advice  Call your healthcare provider right away if any of these occur:    Headache or dizziness that won t go away    Redness, warmth, or pus from the swollen area  Call 911  Call 911 or get medical care right away if any of these occur:    Repeated vomiting (it s common to vomit once after a head injury)    Headache or dizziness that is severe or gets worse    Loss of consciousness    Unusual drowsiness, or unable to wake up as usual    Weakness or decreased ability to walk or move any limb    Confusion, agitation, or change in behavior or speech, or memory loss    Blurred vision    Convulsion (seizure)    Swelling on the scalp or face that gets worse    Changes in pupil size (the black part of the eye)    Fluid draining from or bleeding from the nose or ears  Date Last Reviewed: 6/1/2018 2000-2019 [employer]. [address] [facility], [place] [address]. All  rights reserved. This information is not intended as a substitute for professional medical care. Always follow your healthcare professional's instructions.           Patient Education     Head Injury with Sleep Monitoring (Adult)    You have a head injury. It does not appear serious at this time. But symptoms of a more serious problem, such as mild brain injury (concussion), or bruising or bleeding in the brain, may appear later. For this reason, you and someone caring for you will need to watch for the symptoms listed below. Once at home, also be sure to follow any care instructions you re given.  Home care  Watch for the following symptoms  Someone must stay with you for the next 24 hours (or longer, if directed). If you fall asleep, this person should wake you up every 2 hours, or as directed, to check your symptoms. This is called sleep monitoring. Symptoms to watch for include:    Headache    Nausea or vomiting    Dizziness    Sensitivity to light or noise    Unusual sleepiness or grogginess    Trouble falling asleep    Personality changes    Vision changes    Memory loss    Confusion    Trouble walking or clumsiness    Loss of consciousness (even for a short time)    Inability to be awakened    Stiff neck    Weakness or numbness in any part of the body    Seizures    Bruising behind the ears or around the eyes  If you develop any of these symptoms, seek emergency medical care right away. If none of these symptoms are noted during the first 24 hours, keep watching for symptoms for the next day or so. Ask your provider if someone should stay with you during this time.   General care    If you were prescribed medicines for pain, use them as directed. Don t use other pain medicines without checking with your provider first.    To help reduce swelling and pain, apply a cold source to the injured area for up to 20 minutes at a time. Do this as often as directed. Use a cold pack or bag of ice wrapped in a thin towel.  Never apply a cold source directly to the skin.    If you have cuts or scrapes as a result of your injury, care for them as directed.    For the next 24 hours (or longer, if instructed):  ? Don t drink alcohol or use sedatives or other medicines that make you sleepy.  ? Don t drive or operate machinery.  ? Don t do anything strenuous, such as heavy lifting or straining.  ? Limit tasks that require concentration. This includes reading, using a smartphone or computer, watching TV, and playing video games.  ? Don t return to sports or other activity that could result in another head injury.  Follow-up care  Follow up with your healthcare provider, or as directed. If imaging tests were done, they will be reviewed by a doctor. You will be told the results and any new findings that may affect your care.  When to seek medical advice  Call your healthcare provider right away if any of these occur:    Pain doesn t get better or worsens    New or increased swelling or bruising    Fever of 100.4 F (38 C) or higher, or as directed by your provider    Redness, warmth, bleeding, or drainage from the injured area    Any depression or bony abnormality in the injured area    Fluid drainage or bleeding from the nose or ears    Bruising behind the ears or around the eyes    Lethargy or excessive sleepiness  Date Last Reviewed: 4/1/2018 2000-2019 [employer]. [address] [place] [address], [place] [address]. All rights reserved. This information is not intended as a substitute for professional medical care. Always follow your healthcare professional's instructions.

## 2020-03-05 DIAGNOSIS — J20.9 ACUTE BRONCHITIS, UNSPECIFIED ORGANISM: ICD-10-CM

## 2020-03-05 RX ORDER — FLUTICASONE PROPIONATE AND SALMETEROL XINAFOATE 115; 21 UG/1; UG/1
AEROSOL, METERED RESPIRATORY (INHALATION)
Qty: 12 G | Refills: 1 | Status: SHIPPED | OUTPATIENT
Start: 2020-03-05 | End: 2020-06-30

## 2020-03-05 NOTE — TELEPHONE ENCOUNTER
"advair  Last Written Prescription Date:  01/09/2020  Last Fill Quantity: 1,  # refills: 1   Last office visit: 2/27/2020 with prescribing provider:  Lazarus  Future Office Visit:   Next 5 appointments (look out 90 days)    Mar 27, 2020 10:00 AM CDT  SHORT with Sukh Qiu PA-C  [facility] [address] [facility] [address] [phone]         Requested Prescriptions   Pending Prescriptions Disp Refills     ADVAIR -21 MCG/ACT inhaler [Pharmacy Med Name: ADVAIR -21MCG/ACT AERO] 12 g 1     Sig: INHALE TWO PUFFS BY MOUTH TWICE A DAY       Inhaled Steroids Protocol Passed - 3/5/2020  3:54 PM        Passed - Patient is age 12 or older        Passed - Recent (12 mo) or future (30 days) visit within the authorizing provider's specialty     Patient has had an office visit with the authorizing provider or a provider within the authorizing providers department within the previous 12 mos or has a future within next 30 days. See \"Patient Info\" tab in inbasket, or \"Choose Columns\" in Meds & Orders section of the refill encounter.              Passed - Medication is active on med list        Angelika Diamond RN BSN    "
1.47

## 2020-03-26 ENCOUNTER — VIRTUAL VISIT (OUTPATIENT)
Dept: FAMILY MEDICINE | Facility: OTHER | Age: 40
End: 2020-03-26
Payer: COMMERCIAL

## 2020-03-26 ENCOUNTER — NURSE TRIAGE (OUTPATIENT)
Dept: NURSING | Facility: CLINIC | Age: 40
End: 2020-03-26

## 2020-03-26 DIAGNOSIS — J06.9 UPPER RESPIRATORY TRACT INFECTION, UNSPECIFIED TYPE: ICD-10-CM

## 2020-03-26 DIAGNOSIS — R09.82 POST-NASAL DRAINAGE: ICD-10-CM

## 2020-03-26 DIAGNOSIS — I10 ESSENTIAL HYPERTENSION: ICD-10-CM

## 2020-03-26 DIAGNOSIS — F17.200 TOBACCO USE DISORDER: Primary | ICD-10-CM

## 2020-03-26 PROCEDURE — 99441 ZZC PHYSICIAN TELEPHONE EVALUATION 5-10 MIN: CPT | Performed by: PHYSICIAN ASSISTANT

## 2020-03-26 RX ORDER — BENZONATATE 100 MG/1
100 CAPSULE ORAL 3 TIMES DAILY PRN
Qty: 21 CAPSULE | Refills: 0 | Status: SHIPPED | OUTPATIENT
Start: 2020-03-26 | End: 2020-04-23

## 2020-03-26 RX ORDER — CETIRIZINE HYDROCHLORIDE 10 MG/1
10 TABLET ORAL DAILY
Qty: 90 TABLET | Refills: 1 | Status: SHIPPED | OUTPATIENT
Start: 2020-03-26 | End: 2021-08-31

## 2020-03-26 RX ORDER — AMOXICILLIN 875 MG
875 TABLET ORAL 2 TIMES DAILY
Qty: 14 TABLET | Refills: 0 | Status: SHIPPED | OUTPATIENT
Start: 2020-03-26 | End: 2020-06-30

## 2020-03-26 RX ORDER — LISINOPRIL 10 MG/1
10 TABLET ORAL DAILY
Qty: 30 TABLET | Refills: 1 | Status: SHIPPED | OUTPATIENT
Start: 2020-03-26 | End: 2020-06-02

## 2020-03-26 RX ORDER — ALBUTEROL SULFATE 90 UG/1
2 AEROSOL, METERED RESPIRATORY (INHALATION) EVERY 4 HOURS PRN
Qty: 1 INHALER | Refills: 0 | Status: SHIPPED | OUTPATIENT
Start: 2020-03-26 | End: 2020-06-02

## 2020-03-26 RX ORDER — GUAIFENESIN 600 MG/1
600-1200 TABLET, EXTENDED RELEASE ORAL 2 TIMES DAILY PRN
Qty: 40 TABLET | Refills: 0 | Status: SHIPPED | OUTPATIENT
Start: 2020-03-26 | End: 2020-06-30

## 2020-03-26 NOTE — TELEPHONE ENCOUNTER
Albuterol:  Routing refill request to provider for review/approval because:  Drug not on the G refill protocol for this Dx    Lisinopril:  Routing refill request to provider for review/approval because:  Labs out of range:  BP  ZULEMA BhattN, RN

## 2020-03-26 NOTE — NURSING NOTE
Health Maintenance Due   Topic Date Due     PREVENTIVE CARE VISIT  1980     DTAP/TDAP/TD IMMUNIZATION (1 - Tdap) 07/14/1991     HIV SCREENING  07/14/1995     PNEUMOCOCCAL IMMUNIZATION 19-64 MEDIUM RISK (1 of 1 - PPSV23) 07/14/1999     PAP  07/14/2001     INFLUENZA VACCINE (1) 09/01/2019

## 2020-03-26 NOTE — TELEPHONE ENCOUNTER
"Requested Prescriptions   Pending Prescriptions Disp Refills     albuterol (PROAIR HFA/PROVENTIL HFA/VENTOLIN HFA) 108 (90 Base) MCG/ACT inhaler 1 Inhaler 0     Sig: Inhale 2 puffs into the lungs every 4 hours as needed for shortness of breath / dyspnea or wheezing   Last Written Prescription Date:  1/9/2020  Last Fill Quantity: 1 inhaler,  # refills: 0   Last office visit: 2/27/2020 with prescribing provider:     Future Office Visit:      No flowsheet data found.      Asthma Maintenance Inhalers - Anticholinergics Passed - 3/26/2020  9:57 AM        Passed - Patient is age 12 years or older        Passed - Recent (12 mo) or future (30 days) visit within the authorizing provider's specialty     Patient has had an office visit with the authorizing provider or a provider within the authorizing providers department within the previous 12 mos or has a future within next 30 days. See \"Patient Info\" tab in inbasket, or \"Choose Columns\" in Meds & Orders section of the refill encounter.              Passed - Medication is active on med list       Short-Acting Beta Agonist Inhalers Protocol  Passed - 3/26/2020  9:57 AM        Passed - Patient is age 12 or older        Passed - Recent (12 mo) or future (30 days) visit within the authorizing provider's specialty     Patient has had an office visit with the authorizing provider or a provider within the authorizing providers department within the previous 12 mos or has a future within next 30 days. See \"Patient Info\" tab in inbasket, or \"Choose Columns\" in Meds & Orders section of the refill encounter.              Passed - Medication is active on med list           lisinopril (ZESTRIL) 10 MG tablet 30 tablet 1     Sig: Take 1 tablet (10 mg) by mouth daily   Last Written Prescription Date:  2/27/2020  Last Fill Quantity: 30,  # refills: 1   Last office visit: 2/27/2020 with prescribing provider:     Future Office Visit:        ACE Inhibitors (Including Combos) Protocol Failed - " "3/26/2020  9:57 AM        Failed - Blood pressure under 140/90 in past 12 months     BP Readings from Last 3 Encounters:   02/27/20 (!) 158/100   02/17/20 (!) 160/100   01/14/20 130/80                 Failed - Normal serum creatinine on file in past 12 months     No lab results found.    Ok to refill medication if creatinine is low          Failed - Normal serum potassium on file in past 12 months     No lab results found.          Passed - Recent (12 mo) or future (30 days) visit within the authorizing provider's specialty     Patient has had an office visit with the authorizing provider or a provider within the authorizing providers department within the previous 12 mos or has a future within next 30 days. See \"Patient Info\" tab in inbasket, or \"Choose Columns\" in Meds & Orders section of the refill encounter.              Passed - Medication is active on med list        Passed - Patient is age 18 or older        Passed - No active pregnancy on record        Passed - No positive pregnancy test within past 12 months             "

## 2020-03-26 NOTE — PROGRESS NOTES
"Marily Fabian is a 39 year old female who is being evaluated via a billable telephone visit.      The patient has been notified of following:     \"This telephone visit will be conducted via a call between you and your physician/provider. We have found that certain health care needs can be provided without the need for a physical exam.  This service lets us provide the care you need with a short phone conversation.  If a prescription is necessary we can send it directly to your pharmacy.  If lab work is needed we can place an order for that and you can then stop by our lab to have the test done at a later time.    If during the course of the call the physician/provider feels a telephone visit is not appropriate, you will not be charged for this service.\"     Marily Fabian complains of   Chief Complaint   Patient presents with     URI     3-4 days       I have reviewed and updated the patient's Past Medical History, Social History, Family History and Medication List.    ALLERGIES  Clarithromycin and Ibuprofen    Patient is a 39 year old female who calls in today with complaint of URI symptoms. Patient does have a past medical history of tobacco use and seasonal allergies. She has albuterol as needed at home and uses Advair HFA once daily. Patient has used antihistamine in the past, but states that she is out of this medication at this time. Current symptoms began with feeling congested 3-4 days and have slowly progressed into a productive cough, nasal congestion and post nasal drip. She has had to increase her albuterol use to 2-3x/day with relief of SOB following use. Patient does sound congested during interview today. Denies fever, sore throat or possible exposure to sick individuals. Associated symptoms include headache and those listed in the HPI.     Assessment/Plan:  1. Upper respiratory tract infection, unspecified type  Discussed with the patient that I suspect her symptoms are due to combination of viral and " allergies. I have advised her to resume use of the cetirizine to help reduce rhinorrhea and post nasal drip. In addition, she will begin use of guaifenesin to improve cough and will have tessalon pearles to use as needed. Educated on realistic timeline for symptoms to improve and advised her to refrain from use of antibiotics unless symptoms worsen, change or new symptoms appear.   - cetirizine (ZYRTEC) 10 MG tablet; Take 1 tablet (10 mg) by mouth daily  Dispense: 90 tablet; Refill: 1  - guaiFENesin (MUCINEX) 600 MG 12 hr tablet; Take 1-2 tablets (600-1,200 mg) by mouth 2 times daily as needed for congestion or cough  Dispense: 40 tablet; Refill: 0  - benzonatate (TESSALON) 100 MG capsule; Take 1 capsule (100 mg) by mouth 3 times daily as needed for cough  Dispense: 21 capsule; Refill: 0  - amoxicillin (AMOXIL) 875 MG tablet; Take 1 tablet (875 mg) by mouth 2 times daily for 7 days  Dispense: 14 tablet; Refill: 0    2. Tobacco use disorder  Patient is aware that smoking cessation is advised. Not interested at this time. Supportive cares and continued use of inhalers are advised.   - guaiFENesin (MUCINEX) 600 MG 12 hr tablet; Take 1-2 tablets (600-1,200 mg) by mouth 2 times daily as needed for congestion or cough  Dispense: 40 tablet; Refill: 0  - benzonatate (TESSALON) 100 MG capsule; Take 1 capsule (100 mg) by mouth 3 times daily as needed for cough  Dispense: 21 capsule; Refill: 0    3. Post-nasal drainage  Begin antihistamine to help reduce rhinorrhea. Will have antibiotic with instruction not to fill unless she calls if not improving as expected.   - cetirizine (ZYRTEC) 10 MG tablet; Take 1 tablet (10 mg) by mouth daily  Dispense: 90 tablet; Refill: 1  - amoxicillin (AMOXIL) 875 MG tablet; Take 1 tablet (875 mg) by mouth 2 times daily for 7 days  Dispense: 14 tablet; Refill: 0    Phone call duration:  6 minutes    Sukh Qiu PA-C

## 2020-03-26 NOTE — TELEPHONE ENCOUNTER
Reason for Call:  Other prescription    Detailed comments: Pt called this morning and would like a refill on her albuterol inhaler as well as her lisinopril. PLease refill these and give pt a call if there are any further questions. Thank you.    Phone Number Patient can be reached at: Home number on file [phone] (home)    Best Time:     Can we leave a detailed message on this number? YES    Call taken on 3/26/2020 at 9:42 AM by Kristin Ayala

## 2020-03-26 NOTE — TELEPHONE ENCOUNTER
She is refusing the ER disposition. She can't drive to the ER. I connected her with scheduling for a telephone visit today. 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  Marily is using her inhalers more frequently today.  Celia Calix RN  [facility]      Reason for Disposition    Difficulty breathing     Doing inhalers more frequently, blowing out green stuff.    Additional Information    Negative: Bluish (or gray) lips or face    Negative: Severe difficulty breathing (e.g., struggling for each breath, speaks in single words)    Negative: Rapid onset of cough and has hives    Negative: Coughing started suddenly after medicine, an allergic food or bee sting    Negative: Difficulty breathing after exposure to flames, smoke, or fumes    Negative: Sounds like a life-threatening emergency to the triager    Negative: Previous asthma attacks and this feels like asthma attack    Negative: Chest pain present when not coughing    Protocols used: COUGH-A-OH

## 2020-04-03 ENCOUNTER — TELEPHONE (OUTPATIENT)
Dept: FAMILY MEDICINE | Facility: OTHER | Age: 40
End: 2020-04-03

## 2020-04-03 DIAGNOSIS — R30.0 DYSURIA: Primary | ICD-10-CM

## 2020-04-03 RX ORDER — FLUCONAZOLE 150 MG/1
150 TABLET ORAL ONCE
Qty: 1 TABLET | Refills: 0 | Status: SHIPPED | OUTPATIENT
Start: 2020-04-03 | End: 2020-06-30

## 2020-04-03 NOTE — TELEPHONE ENCOUNTER
Reason for Call:  Medication or medication refill:    Do you use a [facility]?  Name of the pharmacy and phone number for the current request[phone]    Name of the medication requested: pill for yeast infection     Other request: patient calling states she got an antibiotic from Sukh Qiu, and now has a yeast infection. Patient would  Like to get an Rx for it. Please call to advise     Can we leave a detailed message on this number? YES    Phone number patient can be reached at: Cell number on file:    Telephone Information:   Mobile [phone]       Best Time: any    Call taken on 4/3/2020 at 9:19 AM by Aurora Barba

## 2020-04-22 ENCOUNTER — TELEPHONE (OUTPATIENT)
Dept: FAMILY MEDICINE | Facility: OTHER | Age: 40
End: 2020-04-22

## 2020-04-22 NOTE — TELEPHONE ENCOUNTER
Please see if patient would be willing to do a phone visit to discuss concerns about sleep and possible treatments.     Thanks,  Sukh Qiu PA-C on 4/22/2020 at 12:24 PM

## 2020-04-22 NOTE — TELEPHONE ENCOUNTER
Reason for call:  Other   Patient called regarding (reason for call): prescription  Additional comments: Patient stated that her sleeping pills are not working anymore. She's been having to wake up and take melatonin. She is wondering if she can have her medication dosage increased, change or something to help her with her sleep.    Phone number to reach patient:  Cell number on file:    Telephone Information:   Mobile [phone]       Best Time:  any    Can we leave a detailed message on this number?  YES    Travel screening: Not Applicable     Selene NEWTON  Central Scheduler

## 2020-04-23 ENCOUNTER — VIRTUAL VISIT (OUTPATIENT)
Dept: FAMILY MEDICINE | Facility: CLINIC | Age: 40
End: 2020-04-23
Payer: COMMERCIAL

## 2020-04-23 DIAGNOSIS — G47.9 SLEEPING DIFFICULTIES: ICD-10-CM

## 2020-04-23 PROCEDURE — 99441 ZZC PHYSICIAN TELEPHONE EVALUATION 5-10 MIN: CPT | Performed by: PHYSICIAN ASSISTANT

## 2020-04-23 ASSESSMENT — ANXIETY QUESTIONNAIRES
GAD7 TOTAL SCORE: 17
2. NOT BEING ABLE TO STOP OR CONTROL WORRYING: NEARLY EVERY DAY
7. FEELING AFRAID AS IF SOMETHING AWFUL MIGHT HAPPEN: NOT AT ALL
1. FEELING NERVOUS, ANXIOUS, OR ON EDGE: NEARLY EVERY DAY
3. WORRYING TOO MUCH ABOUT DIFFERENT THINGS: NEARLY EVERY DAY
6. BECOMING EASILY ANNOYED OR IRRITABLE: MORE THAN HALF THE DAYS
IF YOU CHECKED OFF ANY PROBLEMS ON THIS QUESTIONNAIRE, HOW DIFFICULT HAVE THESE PROBLEMS MADE IT FOR YOU TO DO YOUR WORK, TAKE CARE OF THINGS AT HOME, OR GET ALONG WITH OTHER PEOPLE: SOMEWHAT DIFFICULT
5. BEING SO RESTLESS THAT IT IS HARD TO SIT STILL: NEARLY EVERY DAY

## 2020-04-23 ASSESSMENT — PATIENT HEALTH QUESTIONNAIRE - PHQ9
SUM OF ALL RESPONSES TO PHQ QUESTIONS 1-9: 16
5. POOR APPETITE OR OVEREATING: NEARLY EVERY DAY

## 2020-04-23 NOTE — PATIENT INSTRUCTIONS
"  Patient Education     Tips for Sleep Hygiene  \"Sleep hygiene\" means having good sleep habits.Follow these tips to sleep better at night:     Get on a schedule. Go to bed and get up at about the same time every day.    Listen to your body. Only try to sleep when you actually feel tired or sleepy.    Be patient. If you haven't been able to get to sleep after about 30 minutes or more, get up and do something calming or boring until you feel sleepy. Then return to bed and try again.    Don't have caffeine (coffee, tea, cola drinks, chocolate and some medicines), alcohol or nicotine (cigarettes). These can make it harder for you to fall asleep and stay asleep.    Use your bed for sleeping only. That means no TV, computer or homework in bed, especially during the evening and before bedtime.    Don't nap during the day. If you must nap, make sure it is for less than 20 minutes.    Create sleep rituals that remind your body it is time to sleep. Examples include breathing exercises, stretching or reading a book.    Avoid all electronic media (smart phone, computer, tablet) within 2 hours of bed time. The \"blue light\" in these devices activates the part of the brain that keeps you awake.    Dim the lights at night.    Get early morning sources of light (walk in the sunshine) to help set sleep patterns at night.    Try a bath or shower before bed. Having a warm bath 1 to 2 hours before bedtime can help you feel sleepy. Hot baths can make you alert, so be mindful of the temperature.    Don't watch the clock. Checking the clock during the night can wake you up. It can also lead to negative thoughts such as, \"I will never fall asleep,\" which can increase anxiety and sleeplessness.    Use a sleep diary. Track your sleep schedule to know your sleep patterns and to see where you can improve.    Get regular exercise every day. Try not to do heavy exercise in the 4 hours before bedtime.    Eat a healthy, balanced diet.    Try eating " a light, healthy snack before bed, but avoid eating a heavy meal.    Create the right sleeping area. A cool, dark, quiet room is best. If needed, try earplugs, fans and blackout curtains.    Keep your daytime routine the same even if you have a bad night sleep. Avoiding activities the next day can make it harder to sleep.  For informational purposes only. Not to replace the advice of your health care provider.   Copyright   2013 [employer]. All rights reserved. [employer] [id number] - 01/16.  For informational purposes only. Not to replace the advice of your health care provider.  Copyright   2018 [employer]. All rights reserved.

## 2020-04-23 NOTE — TELEPHONE ENCOUNTER
Patient is scheduled for a telephone visit at 10:40 am with Sukh Qiu on 4/23/2020. Laly Simms CMA

## 2020-04-23 NOTE — PROGRESS NOTES
"Marily Fabian is a 39 year old female who is being evaluated via a billable telephone visit.      The patient has been notified of following:     \"This telephone visit will be conducted via a call between you and your physician/provider. We have found that certain health care needs can be provided without the need for a physical exam.  This service lets us provide the care you need with a short phone conversation.  If a prescription is necessary we can send it directly to your pharmacy.  If lab work is needed we can place an order for that and you can then stop by our lab to have the test done at a later time.    Telephone visits are billed at different rates depending on your insurance coverage. During this emergency period, for some insurers they may be billed the same as an in-person visit.  Please reach out to your insurance provider with any questions.    If during the course of the call the physician/provider feels a telephone visit is not appropriate, you will not be charged for this service.\"    Patient has given verbal consent for Telephone visit?  Yes    How would you like to obtain your AVS? no     Subjective     Marily Fabian is a 39 year old female who presents to clinic today for the following health issues:    HPI    Patient is a 39 year old female who calls in today to discuss an increase in her amitriptyline. She has been taking 10mg at bedtime to help with sleep and mood as she manages stresses with an ongoing job search and finances. Recently she has felt that the medication has not been as effective and finds that she is waking in the middle of the night and unable to fall back asleep without an additional OTC sleep aid. She attributes this to the difficulties in finding a job during the quarantine. Denies worsening mood, thoughts and wanting to hurt self or others.     Reviewed and updated as needed this visit by Provider         Review of Systems   ROS COMP: Constitutional, HEENT, " cardiovascular, pulmonary, gi and gu systems are negative, except as otherwise noted.       Assessment/Plan:  1. Sleeping difficulties  Patient will increase amitriptyline to 25mg at bedtime and monitor for adverse effects. If she does not tolerate the increase or symptoms persist she will call to update. Sleep hygiene reviewed with patient as this will aid in promoting healthy sleep cycles. Follow up in 1 month.   - amitriptyline (ELAVIL) 25 MG tablet; Take 1 tablet (25 mg) by mouth At Bedtime  Dispense: 30 tablet; Refill: 1    Return in about 1 month (around 5/23/2020) for Medication Recheck - amitriptyline.      Phone call duration:  6 minutes    Sukh Qiu PA-C

## 2020-04-24 ASSESSMENT — ANXIETY QUESTIONNAIRES: GAD7 TOTAL SCORE: 17

## 2020-05-11 ENCOUNTER — HOSPITAL ENCOUNTER (EMERGENCY)
Facility: CLINIC | Age: 40
Discharge: HOME | End: 2020-05-11
Attending: NURSE PRACTITIONER | Admitting: NURSE PRACTITIONER
Payer: COMMERCIAL

## 2020-05-11 VITALS
HEIGHT: 60 IN | TEMPERATURE: 98.7 F | HEART RATE: 128 BPM | RESPIRATION RATE: 19 BRPM | WEIGHT: 136 LBS | OXYGEN SATURATION: 100 % | SYSTOLIC BLOOD PRESSURE: 158 MMHG | DIASTOLIC BLOOD PRESSURE: 116 MMHG | BODY MASS INDEX: 26.7 KG/M2

## 2020-05-11 DIAGNOSIS — F10.920 ALCOHOLIC INTOXICATION WITHOUT COMPLICATION (H): ICD-10-CM

## 2020-05-11 LAB
ALBUMIN SERPL-MCNC: 3.6 G/DL (ref 3.4–5)
ALCOHOL BREATH TEST: 0.29 (ref 0–0.01)
ALP SERPL-CCNC: 72 U/L (ref 40–150)
ALT SERPL W P-5'-P-CCNC: 33 U/L (ref 0–50)
AMPHETAMINES UR QL: NOT DETECTED NG/ML
ANION GAP SERPL CALCULATED.3IONS-SCNC: 9 MMOL/L (ref 3–14)
APAP SERPL-MCNC: <2 MG/L (ref 10–20)
AST SERPL W P-5'-P-CCNC: 54 U/L (ref 0–45)
BARBITURATES UR QL SCN: NOT DETECTED NG/ML
BASOPHILS # BLD AUTO: 0 10E9/L (ref 0–0.2)
BASOPHILS NFR BLD AUTO: 0.3 %
BENZODIAZ UR QL SCN: NOT DETECTED NG/ML
BILIRUB SERPL-MCNC: 0.9 MG/DL (ref 0.2–1.3)
BUN SERPL-MCNC: 19 MG/DL (ref 7–30)
BUPRENORPHINE UR QL: NOT DETECTED NG/ML
CALCIUM SERPL-MCNC: 8.2 MG/DL (ref 8.5–10.1)
CANNABINOIDS UR QL: NOT DETECTED NG/ML
CHLORIDE SERPL-SCNC: 105 MMOL/L (ref 94–109)
CO2 SERPL-SCNC: 24 MMOL/L (ref 20–32)
COCAINE UR QL SCN: NOT DETECTED NG/ML
CREAT SERPL-MCNC: 0.61 MG/DL (ref 0.52–1.04)
CRP SERPL-MCNC: <2.9 MG/L (ref 0–8)
D-METHAMPHET UR QL: NOT DETECTED NG/ML
DIFFERENTIAL METHOD BLD: ABNORMAL
EOSINOPHIL NFR BLD AUTO: 0.1 %
ERYTHROCYTE [DISTWIDTH] IN BLOOD BY AUTOMATED COUNT: 12.4 % (ref 10–15)
GFR SERPL CREATININE-BSD FRML MDRD: >90 ML/MIN/{1.73_M2}
GLUCOSE SERPL-MCNC: 105 MG/DL (ref 70–99)
HCT VFR BLD AUTO: 44.8 % (ref 35–47)
HGB BLD-MCNC: 16.6 G/DL (ref 11.7–15.7)
IMM GRANULOCYTES # BLD: 0 10E9/L (ref 0–0.4)
IMM GRANULOCYTES NFR BLD: 0.2 %
LIPASE SERPL-CCNC: 440 U/L (ref 73–393)
LYMPHOCYTES # BLD AUTO: 2.3 10E9/L (ref 0.8–5.3)
LYMPHOCYTES NFR BLD AUTO: 25.2 %
MAGNESIUM SERPL-MCNC: 2.1 MG/DL (ref 1.6–2.3)
MCH RBC QN AUTO: 33.1 PG (ref 26.5–33)
MCHC RBC AUTO-ENTMCNC: 37.1 G/DL (ref 31.5–36.5)
MCV RBC AUTO: 89 FL (ref 78–100)
METHADONE UR QL SCN: NOT DETECTED NG/ML
MONOCYTES # BLD AUTO: 0.6 10E9/L (ref 0–1.3)
MONOCYTES NFR BLD AUTO: 6.5 %
NEUTROPHILS # BLD AUTO: 6.2 10E9/L (ref 1.6–8.3)
NEUTROPHILS NFR BLD AUTO: 67.7 %
NRBC # BLD AUTO: 0 10*3/UL
NRBC BLD AUTO-RTO: 0 /100
OPIATES UR QL SCN: NOT DETECTED NG/ML
OXYCODONE UR QL SCN: NOT DETECTED NG/ML
PCP UR QL SCN: NOT DETECTED NG/ML
PHOSPHATE SERPL-MCNC: 3 MG/DL (ref 2.5–4.5)
PLATELET # BLD AUTO: 285 10E9/L (ref 150–450)
POTASSIUM SERPL-SCNC: 3.4 MMOL/L (ref 3.4–5.3)
PROPOXYPH UR QL: NOT DETECTED NG/ML
PROT SERPL-MCNC: 7.5 G/DL (ref 6.8–8.8)
RBC # BLD AUTO: 5.02 10E12/L (ref 3.8–5.2)
SALICYLATES SERPL-MCNC: <2 MG/DL
SODIUM SERPL-SCNC: 138 MMOL/L (ref 133–144)
TRICYCLICS UR QL SCN: NOT DETECTED NG/ML
WBC # BLD AUTO: 9.1 10E9/L (ref 4–11)

## 2020-05-11 PROCEDURE — 84100 ASSAY OF PHOSPHORUS: CPT | Performed by: NURSE PRACTITIONER

## 2020-05-11 PROCEDURE — 80329 ANALGESICS NON-OPIOID 1 OR 2: CPT | Mod: 59 | Performed by: NURSE PRACTITIONER

## 2020-05-11 PROCEDURE — 80053 COMPREHEN METABOLIC PANEL: CPT | Performed by: NURSE PRACTITIONER

## 2020-05-11 PROCEDURE — 96375 TX/PRO/DX INJ NEW DRUG ADDON: CPT | Performed by: NURSE PRACTITIONER

## 2020-05-11 PROCEDURE — 96374 THER/PROPH/DIAG INJ IV PUSH: CPT | Performed by: NURSE PRACTITIONER

## 2020-05-11 PROCEDURE — 83735 ASSAY OF MAGNESIUM: CPT | Performed by: NURSE PRACTITIONER

## 2020-05-11 PROCEDURE — 25000128 H RX IP 250 OP 636: Performed by: NURSE PRACTITIONER

## 2020-05-11 PROCEDURE — 99285 EMERGENCY DEPT VISIT HI MDM: CPT | Mod: 25 | Performed by: NURSE PRACTITIONER

## 2020-05-11 PROCEDURE — 25000125 ZZHC RX 250: Performed by: NURSE PRACTITIONER

## 2020-05-11 PROCEDURE — 96376 TX/PRO/DX INJ SAME DRUG ADON: CPT | Performed by: NURSE PRACTITIONER

## 2020-05-11 PROCEDURE — 82075 ASSAY OF BREATH ETHANOL: CPT | Performed by: NURSE PRACTITIONER

## 2020-05-11 PROCEDURE — 86140 C-REACTIVE PROTEIN: CPT | Performed by: NURSE PRACTITIONER

## 2020-05-11 PROCEDURE — 25800030 ZZH RX IP 258 OP 636: Performed by: NURSE PRACTITIONER

## 2020-05-11 PROCEDURE — 99285 EMERGENCY DEPT VISIT HI MDM: CPT | Mod: Z6 | Performed by: NURSE PRACTITIONER

## 2020-05-11 PROCEDURE — 80329 ANALGESICS NON-OPIOID 1 OR 2: CPT | Performed by: NURSE PRACTITIONER

## 2020-05-11 PROCEDURE — 96361 HYDRATE IV INFUSION ADD-ON: CPT | Performed by: NURSE PRACTITIONER

## 2020-05-11 PROCEDURE — 90791 PSYCH DIAGNOSTIC EVALUATION: CPT

## 2020-05-11 PROCEDURE — 85025 COMPLETE CBC W/AUTO DIFF WBC: CPT | Performed by: NURSE PRACTITIONER

## 2020-05-11 PROCEDURE — 83690 ASSAY OF LIPASE: CPT | Performed by: NURSE PRACTITIONER

## 2020-05-11 PROCEDURE — 80306 DRUG TEST PRSMV INSTRMNT: CPT | Performed by: NURSE PRACTITIONER

## 2020-05-11 RX ORDER — LORAZEPAM 2 MG/ML
1 INJECTION INTRAMUSCULAR ONCE
Status: COMPLETED | OUTPATIENT
Start: 2020-05-11 | End: 2020-05-11

## 2020-05-11 RX ORDER — DIPHENHYDRAMINE HYDROCHLORIDE 50 MG/ML
25 INJECTION INTRAMUSCULAR; INTRAVENOUS ONCE
Status: COMPLETED | OUTPATIENT
Start: 2020-05-11 | End: 2020-05-11

## 2020-05-11 RX ORDER — ONDANSETRON 2 MG/ML
4 INJECTION INTRAMUSCULAR; INTRAVENOUS ONCE
Status: DISCONTINUED | OUTPATIENT
Start: 2020-05-11 | End: 2020-05-11 | Stop reason: HOSPADM

## 2020-05-11 RX ADMIN — DIPHENHYDRAMINE HYDROCHLORIDE 25 MG: 50 INJECTION, SOLUTION INTRAMUSCULAR; INTRAVENOUS at 18:35

## 2020-05-11 RX ADMIN — LORAZEPAM 1 MG: 2 INJECTION INTRAMUSCULAR; INTRAVENOUS at 16:48

## 2020-05-11 RX ADMIN — FOLIC ACID: 5 INJECTION, SOLUTION INTRAMUSCULAR; INTRAVENOUS; SUBCUTANEOUS at 16:22

## 2020-05-11 RX ADMIN — LORAZEPAM 1 MG: 2 INJECTION INTRAMUSCULAR; INTRAVENOUS at 18:32

## 2020-05-11 ASSESSMENT — MIFFLIN-ST. JEOR: SCORE: 1213.39

## 2020-05-11 NOTE — ED NOTES
Reassessed NOEMY and resulted .271, finished meal tray and resting in bed. Provided warm blanket.

## 2020-05-11 NOTE — ED NOTES
Report taken from YAMILETH Carney. Provided pt with safety meal tray. Cooperative and sitting in bed.

## 2020-05-11 NOTE — ED PROVIDER NOTES
History     Chief Complaint   Patient presents with     Alcohol Problem     Suicidal     HPI  Marily Fabian is a 39 year old female who presents to the emergency room today acutely intoxicated with abdominal pain, generalized.  Patient has been drinking vodka for the last several days at her boyfriend jacquelyn.  Patient also admits to methamphetamine abuse and it feels like she would like to drink herself to death because she is tired of her addiction issues.    Allergies:  Allergies   Allergen Reactions     Clarithromycin      biaxin     Ibuprofen        Problem List:    Patient Active Problem List    Diagnosis Date Noted     Post-nasal drainage 03/26/2020     Priority: Medium     Tobacco use disorder 11/13/2019     Priority: Medium        Past Medical History:    Past Medical History:   Diagnosis Date     Anxiety      Depressive disorder      Depressive disorder, not elsewhere classified        Past Surgical History:    Past Surgical History:   Procedure Laterality Date     C APPENDECTOMY  06/2000     C DRAIN OVARIAN ABSCESS,ABD APPRCH  03/23/2001     CONIZATION CERVIX,LOOP ELECTRD  03/23/2001     TUBAL LIGATION         Family History:    Family History   Problem Relation Age of Onset     Hypertension Mother      Depression Mother      Mental Illness Mother         anxiety     Unknown/Adopted Father      Unknown/Adopted Maternal Grandmother      Cancer Maternal Grandmother         lung cancer     Unknown/Adopted Maternal Grandfather      Hypertension Maternal Grandfather      Cancer Paternal Grandmother      Diabetes Paternal Grandmother         brain and lung cancer     Unknown/Adopted Paternal Grandfather      Attention Deficit Disorder Brother      Depression Sister      Attention Deficit Disorder Son      Depression Daughter      Depression Sister      Depression Sister      Depression Son        Social History:  Marital Status:  Single [1]  Social History     Tobacco Use     Smoking status: Current Every Day  Smoker     Packs/day: 0.50     Years: 24.00     Pack years: 12.00     Types: Cigarettes     Smokeless tobacco: Never Used   Substance Use Topics     Alcohol use: Yes     Comment: weekends      Drug use: Never        Medications:    ADVAIR -21 MCG/ACT inhaler  albuterol (PROAIR HFA/PROVENTIL HFA/VENTOLIN HFA) 108 (90 Base) MCG/ACT inhaler  amitriptyline (ELAVIL) 25 MG tablet  budesonide-formoterol (SYMBICORT) 160-4.5 MCG/ACT Inhaler  cetirizine (ZYRTEC) 10 MG tablet  guaiFENesin (MUCINEX) 600 MG 12 hr tablet  lisinopril (ZESTRIL) 10 MG tablet  loratadine (CLARITIN) 10 MG tablet  Pseudoephedrine-guaiFENesin (MUCINEX D PO)          Review of Systems   All other systems reviewed and are negative.      Physical Exam   BP: (!) 177/118  Pulse: 102  Temp: 98.7  F (37.1  C)  Resp: 19  Height: 152.4 cm (5')  Weight: 61.7 kg (136 lb)  SpO2: 96 %      Physical Exam  HENT:      Head: Normocephalic.      Mouth/Throat:      Mouth: Mucous membranes are moist.   Eyes:      Extraocular Movements: Extraocular movements intact.   Neck:      Musculoskeletal: Normal range of motion.   Cardiovascular:      Rate and Rhythm: Tachycardia present.      Pulses: Normal pulses.   Pulmonary:      Effort: Pulmonary effort is normal.      Breath sounds: Normal breath sounds.   Abdominal:      General: Bowel sounds are normal.      Palpations: Abdomen is soft.      Tenderness: There is abdominal tenderness (Mild, generalized).   Musculoskeletal: Normal range of motion.   Skin:     General: Skin is warm.      Capillary Refill: Capillary refill takes less than 2 seconds.   Neurological:      General: No focal deficit present.      Mental Status: She is alert.   Psychiatric:      Comments: Anxious, twitchy         ED Course        Procedures      Results for orders placed or performed during the hospital encounter of 05/11/20 (from the past 24 hour(s))   Alcohol breath test POCT   Result Value Ref Range    Alcohol Breath Test 0.295 (A) 0.00 -  0.01   CBC with platelets differential   Result Value Ref Range    WBC 9.1 4.0 - 11.0 10e9/L    RBC Count 5.02 3.8 - 5.2 10e12/L    Hemoglobin 16.6 (H) 11.7 - 15.7 g/dL    Hematocrit 44.8 35.0 - 47.0 %    MCV 89 78 - 100 fl    MCH 33.1 (H) 26.5 - 33.0 pg    MCHC 37.1 (H) 31.5 - 36.5 g/dL    RDW 12.4 10.0 - 15.0 %    Platelet Count 285 150 - 450 10e9/L    Diff Method Automated Method     % Neutrophils 67.7 %    % Lymphocytes 25.2 %    % Monocytes 6.5 %    % Eosinophils 0.1 %    % Basophils 0.3 %    % Immature Granulocytes 0.2 %    Nucleated RBCs 0 0 /100    Absolute Neutrophil 6.2 1.6 - 8.3 10e9/L    Absolute Lymphocytes 2.3 0.8 - 5.3 10e9/L    Absolute Monocytes 0.6 0.0 - 1.3 10e9/L    Absolute Basophils 0.0 0.0 - 0.2 10e9/L    Abs Immature Granulocytes 0.0 0 - 0.4 10e9/L    Absolute Nucleated RBC 0.0    Comprehensive metabolic panel   Result Value Ref Range    Sodium 138 133 - 144 mmol/L    Potassium 3.4 3.4 - 5.3 mmol/L    Chloride 105 94 - 109 mmol/L    Carbon Dioxide 24 20 - 32 mmol/L    Anion Gap 9 3 - 14 mmol/L    Glucose 105 (H) 70 - 99 mg/dL    Urea Nitrogen 19 7 - 30 mg/dL    Creatinine 0.61 0.52 - 1.04 mg/dL    GFR Estimate >90 >60 mL/min/[1.73_m2]    GFR Estimate If Black >90 >60 mL/min/[1.73_m2]    Calcium 8.2 (L) 8.5 - 10.1 mg/dL    Bilirubin Total 0.9 0.2 - 1.3 mg/dL    Albumin 3.6 3.4 - 5.0 g/dL    Protein Total 7.5 6.8 - 8.8 g/dL    Alkaline Phosphatase 72 40 - 150 U/L    ALT 33 0 - 50 U/L    AST 54 (H) 0 - 45 U/L   Lipase   Result Value Ref Range    Lipase 440 (H) 73 - 393 U/L   Acetaminophen level   Result Value Ref Range    Acetaminophen Level <2 mg/L   Salicylate level   Result Value Ref Range    Salicylate Level <2 mg/dL   CRP inflammation   Result Value Ref Range    CRP Inflammation <2.9 0.0 - 8.0 mg/L   Magnesium   Result Value Ref Range    Magnesium 2.1 1.6 - 2.3 mg/dL   Phosphorus   Result Value Ref Range    Phosphorus 3.0 2.5 - 4.5 mg/dL   Urine Drugs of Abuse Screen Panel 13   Result Value  Ref Range    Cannabinoids (23-kye-8-carboxy-9-THC) Not Detected NDET^Not Detected ng/mL    Phencyclidine (Phencyclidine) Not Detected NDET^Not Detected ng/mL    Cocaine (Benzoylecgonine) Not Detected NDET^Not Detected ng/mL    Methamphetamine (d-Methamphetamine) Not Detected NDET^Not Detected ng/mL    Opiates (Morphine) Not Detected NDET^Not Detected ng/mL    Amphetamine (d-Amphetamine) Not Detected NDET^Not Detected ng/mL    Benzodiazepines (Nordiazepam) Not Detected NDET^Not Detected ng/mL    Tricyclic Antidepressants (Desipramine) Not Detected NDET^Not Detected ng/mL    Methadone (Methadone) Not Detected NDET^Not Detected ng/mL    Barbiturates (Butalbital) Not Detected NDET^Not Detected ng/mL    Oxycodone (Oxycodone) Not Detected NDET^Not Detected ng/mL    Propoxyphene (Norpropoxyphene) Not Detected NDET^Not Detected ng/mL    Buprenorphine (Buprenorphine) Not Detected NDET^Not Detected ng/mL       Medications   ondansetron (ZOFRAN) injection 4 mg (has no administration in time range)   sodium chloride 0.9 % 1,000 mL with Infuvite Adult 10 mL, thiamine 100 mg, folic acid 1 mg infusion ( Intravenous Stopped 5/11/20 1707)   LORazepam (ATIVAN) injection 1 mg (1 mg Intravenous Given 5/11/20 1648)   LORazepam (ATIVAN) injection 1 mg (1 mg Intravenous Given 5/11/20 1832)   diphenhydrAMINE (BENADRYL) injection 25 mg (25 mg Intravenous Given 5/11/20 1835)       Assessments & Plan (with Medical Decision Making)  Alcohol intoxication  Patient has a mildly elevated lipase today but her remaining blood work is reassuring, drug screen is negative.  Patient is feeling better here and currently denies any suicidal ideation in 2030, she did speak with a  from [employer] and was agreeable to signing a safety contract, and appointments have been made for patient for follow-up psychiatry as well as starting a rule 25 assessment.  Patient is hemodynamically stable and stable for discharge.  Patient was discharged in stable  condition.     I have reviewed the nursing notes.    I have reviewed the findings, diagnosis, plan and need for follow up with the patient.    New Prescriptions    No medications on file       Final diagnoses:   Alcoholic intoxication without complication (H)       5/11/2020   [facility] EMERGENCY DEPARTMENT     Janice Saeed APRN CNP  05/11/20 2037

## 2020-05-11 NOTE — ED TRIAGE NOTES
States she has been staying in a shed with her boyfriend drinking for the past 5 days and has gone through 8 liters of vodka.  Developed abdominal pain yesterday afternoon and now tried drinking more alcohol and everything comes up.  States she started drinking to hopefully stop breathing, has depression and anxiety.  Feeling suicidal lately.

## 2020-05-11 NOTE — ED AVS SNAPSHOT
[facility] Emergency Department  [address] [place] [address]  Phone:  [phone]  Fax:  [phone]                                    Marily Fabian   MRN: [id number]    Department:  [facility] Emergency Department   Date of Visit:  5/11/2020           After Visit Summary Signature Page    I have received my discharge instructions, and my questions have been answered. I have discussed any challenges I see with this plan with the nurse or doctor.    ..........................................................................................................................................  Patient/Patient Representative Signature      ..........................................................................................................................................  Patient Representative Print Name and Relationship to Patient    ..................................................               ................................................  Date                                   Time    ..........................................................................................................................................  Reviewed by Signature/Title    ...................................................              ..............................................  Date                                               Time          22EPIC Rev 08/18

## 2020-05-11 NOTE — ED NOTES
Pt laying in bed, was restless but cooperative. Administered meds to help sx (see MAR). Ice water provided. Pt is resting.

## 2020-05-12 NOTE — ED NOTES
BP continues to be high, has not taken BP meds, provider informed. Has meds at home and will take/monitor.

## 2020-05-12 NOTE — ED NOTES
Pt is up and sitting in bed talking on phone calling for ride. [facility] set up safety plan and pt to sign. IV removed and reassessed vital signs. Reviewed discharge instruction.

## 2020-05-12 NOTE — ED NOTES
Pt signed safety plan and was given copy, original with file. Pt verbalized understanding. No questions or concerns. Friend is coming to facility to .

## 2020-05-15 ENCOUNTER — TELEPHONE (OUTPATIENT)
Dept: FAMILY MEDICINE | Facility: CLINIC | Age: 40
End: 2020-05-15

## 2020-05-15 NOTE — TELEPHONE ENCOUNTER
Please call patient to assess how she has been doing since her recent ED visit. If her abdominal pain has persisted I would recommend that she schedule a follow up visit with myself or another provider.    Sukh Qiu PA-C on 5/15/2020 at 1:03 PM

## 2020-06-30 ENCOUNTER — VIRTUAL VISIT (OUTPATIENT)
Dept: FAMILY MEDICINE | Facility: CLINIC | Age: 40
End: 2020-06-30
Payer: COMMERCIAL

## 2020-06-30 DIAGNOSIS — F19.90 PROBLEM ASSOCIATED WITH SUBSTANCE USE: Primary | ICD-10-CM

## 2020-06-30 DIAGNOSIS — G47.9 SLEEPING DIFFICULTIES: ICD-10-CM

## 2020-06-30 DIAGNOSIS — J06.9 UPPER RESPIRATORY TRACT INFECTION, UNSPECIFIED TYPE: ICD-10-CM

## 2020-06-30 DIAGNOSIS — N91.2 AMENORRHEA: ICD-10-CM

## 2020-06-30 DIAGNOSIS — J20.9 ACUTE BRONCHITIS, UNSPECIFIED ORGANISM: ICD-10-CM

## 2020-06-30 DIAGNOSIS — I10 ESSENTIAL HYPERTENSION: ICD-10-CM

## 2020-06-30 DIAGNOSIS — R09.82 POST-NASAL DRAINAGE: ICD-10-CM

## 2020-06-30 PROCEDURE — 99214 OFFICE O/P EST MOD 30 MIN: CPT | Mod: 95 | Performed by: PHYSICIAN ASSISTANT

## 2020-06-30 RX ORDER — ALBUTEROL SULFATE 90 UG/1
1-2 AEROSOL, METERED RESPIRATORY (INHALATION) EVERY 6 HOURS PRN
Qty: 18 G | Refills: 0 | Status: SHIPPED | OUTPATIENT
Start: 2020-06-30 | End: 2020-10-14

## 2020-06-30 RX ORDER — FLUTICASONE PROPIONATE 50 MCG
1 SPRAY, SUSPENSION (ML) NASAL DAILY
Qty: 18.2 ML | Refills: 1 | Status: SHIPPED | OUTPATIENT
Start: 2020-06-30 | End: 2021-08-31

## 2020-06-30 RX ORDER — LISINOPRIL AND HYDROCHLOROTHIAZIDE 20; 25 MG/1; MG/1
1 TABLET ORAL DAILY
Qty: 30 TABLET | Refills: 1 | Status: SHIPPED | OUTPATIENT
Start: 2020-06-30 | End: 2021-03-11

## 2020-06-30 RX ORDER — GUAIFENESIN 600 MG/1
1200 TABLET, EXTENDED RELEASE ORAL 2 TIMES DAILY PRN
Qty: 120 TABLET | Refills: 0 | Status: SHIPPED | OUTPATIENT
Start: 2020-06-30 | End: 2020-07-30

## 2020-06-30 RX ORDER — FLUTICASONE PROPIONATE AND SALMETEROL XINAFOATE 115; 21 UG/1; UG/1
2 AEROSOL, METERED RESPIRATORY (INHALATION) 2 TIMES DAILY
Qty: 1 INHALER | Refills: 1 | Status: SHIPPED | OUTPATIENT
Start: 2020-06-30 | End: 2020-12-16

## 2020-06-30 NOTE — NURSING NOTE
Health Maintenance Due   Topic Date Due     PREVENTIVE CARE VISIT  1980     HIV SCREENING  07/14/1995     PNEUMOCOCCAL IMMUNIZATION 19-64 MEDIUM RISK (1 of 1 - PPSV23) 07/14/1999     PAP  07/14/2001     DTAP/TDAP/TD IMMUNIZATION (1 - Tdap) 07/14/2005     Chelsea CAMPOS LPN

## 2020-06-30 NOTE — PROGRESS NOTES
"Marily Fabian is a 39 year old female who is being evaluated via a billable video visit.      The patient has been notified of following:     \"This video visit will be conducted via a call between you and your physician/provider. We have found that certain health care needs can be provided without the need for an in-person physical exam.  This service lets us provide the care you need with a video conversation.  If a prescription is necessary we can send it directly to your pharmacy.  If lab work is needed we can place an order for that and you can then stop by our lab to have the test done at a later time.    Video visits are billed at different rates depending on your insurance coverage.  Please reach out to your insurance provider with any questions.    If during the course of the call the physician/provider feels a video visit is not appropriate, you will not be charged for this service.\"    Patient has given verbal consent for Video visit? Yes  How would you like to obtain your AVS? Mail a copy  Patient would like the video invitation sent by: Text to cell phone: [phone]  Will anyone else be joining your video visit? No    Subjective     Marily Fabian is a 39 year old female who presents today via video visit for the following health issues:    HPI  Hypertension Follow-up      Do you check your blood pressure regularly outside of the clinic? No     Are you following a low salt diet? Yes    Are your blood pressures ever more than 140 on the top number (systolic) OR more   than 90 on the bottom number (diastolic), for example 140/90? unsure      How many servings of fruits and vegetables do you eat daily?  2-3    On average, how many sweetened beverages do you drink each day (Examples: soda, juice, sweet tea, etc.  Do NOT count diet or artificially sweetened beverages)?   2    How many days per week do you exercise enough to make your heart beat faster? 7    How many minutes a day do you exercise enough to " make your heart beat faster? 10 - 19    How many days per week do you miss taking your medication? 0    Medication Followup of zyrtec, albuterol, advair    Taking Medication as prescribed: yes    Side Effects:  None    Medication Helping Symptoms:  yes     Patient is a 39 year old female who presents via video visit today for ED follow, HTN management and medication refill. She was recently seen at the [facility] ED on 05/11 for alcohol intoxication and concerns about ongoing substance use. Review of the note shows that the patient admitted to methamphetamine abuse and had reached out to  from [employer]. Today she states that she has continued to work with them, but it is unclear as to whether she completed the rule 25 assessment or is still pursuant of [facility] programs. Review of lab work showed that she had mildly elevated AST and lipase. We will repeat these labs this month. Patient is concerned about premenopause as she said that her LMP was May 8th and did not have one this month. She reports two negative pregnancy tests. When she comes for repeat labs I will also have her complete a urine HCG. Associated symptoms include some left sided pain with intercourse.     Blood pressure at ED visit was very high at 177/118, patient assures me that she has been taking the lisinopril as directed. I discussed switching to combination with hydrochlorothiazide and having a float BP check at time of labs. Patient denies chest pain/pressure, trouble breathing, abdominal pain, nausea/vomiting. She continues to smoke daily, not interested in quitting today. Relies on advair for management of COPD as well as albuterol for as needed relief. Patient feels that she has needed the albuterol more at night as she is coughing up phlegm. Feels her allergies are worse and denies fever, headache, cough during the day (outside of norm) or recent exposure to sick people.     Assessments & Plan (with Medical Decision  Making)  Alcohol intoxication  Patient has a mildly elevated lipase today but her remaining blood work is reassuring, drug screen is negative.  Patient is feeling better here and currently denies any suicidal ideation in 2030, she did speak with a  from [employer] and was agreeable to signing a safety contract, and appointments have been made for patient for follow-up psychiatry as well as starting a rule 25 assessment.  Patient is hemodynamically stable and stable for discharge.  Patient was discharged in stable condition.    Reviewed and updated as needed this visit by Provider         Review of Systems   Constitutional, HEENT, cardiovascular, pulmonary, gi and gu systems are negative, except as otherwise noted.      Objective             Physical Exam     GENERAL: healthy, alert, no distress and appears older than stated age  EYES: Eyes grossly normal to inspection.  No discharge or erythema, or obvious scleral/conjunctival abnormalities.  RESP: No audible wheeze, cough, or visible cyanosis.  No visible retractions or increased work of breathing.    SKIN: Visible skin clear. No significant rash, abnormal pigmentation or lesions.  NEURO: Cranial nerves grossly intact.  Mentation and speech appropriate for age.  PSYCH: Mentation appears normal, affect normal/bright, judgement and insight intact, normal speech and appearance well-groomed.      Diagnostic Test Results:  Labs reviewed in Epic        Assessment & Plan       ICD-10-CM    1. Problem associated with substance use  F19.90 Comprehensive metabolic panel (BMP + Alb, Alk Phos, ALT, AST, Total. Bili, TP)     CBC with platelets and differential   2. Essential hypertension  I10 lisinopril-hydrochlorothiazide (ZESTORETIC) 20-25 MG tablet     Comprehensive metabolic panel (BMP + Alb, Alk Phos, ALT, AST, Total. Bili, TP)     CBC with platelets and differential   3. Acute bronchitis, unspecified organism  J20.9 fluticasone-salmeterol (ADVAIR HFA) 115-21 MCG/ACT  inhaler     guaiFENesin (MUCINEX) 600 MG 12 hr tablet   4. Upper respiratory tract infection, unspecified type  J06.9 fluticasone (FLONASE) 50 MCG/ACT nasal spray   5. Post-nasal drainage  R09.82 fluticasone (FLONASE) 50 MCG/ACT nasal spray     guaiFENesin (MUCINEX) 600 MG 12 hr tablet   6. Amenorrhea  N91.2 Follicle stimulating hormone     CBC with platelets and differential     HCG qualitative urine        Patient will stop lisinopril and start combination with hydrochlorothiazide, she will call clinic to schedule BP check in next 2-4 weeks as well as labs. She will have guaifenesin for cough at night and will start fluticasone nasal spray to help reduce post nasal drip. Pending labs consider US to further evaluate causes for amenorrhea.       Return for BP Recheck, Lab Work/Review.    Sukh Qiu PA-C  [facility]      Video-Visit Details    Type of service:  Video Visit    Video End Time:Start: 06/30/2020 10:04 am   Stop: 06/30/2020 10:17 am    Originating Location (pt. Location): Home    Distant Location (provider location):  [facility]     Platform used for Video Visit: Diomedes    No follow-ups on file.       Sukh Qiu PA-C

## 2020-06-30 NOTE — TELEPHONE ENCOUNTER
Routing refill request to provider for review/approval because:  Labs out of range:  BP    ZULEMA AbrahamN, RN  [facility]

## 2020-10-12 DIAGNOSIS — J06.9 UPPER RESPIRATORY TRACT INFECTION, UNSPECIFIED TYPE: ICD-10-CM

## 2020-10-12 NOTE — LETTER
[address] [address]  Phone: [phone]  Fax: [phone]        October 16, 2020      Marily Fabian                                                                                                                                [address] [facility] [address]            Dear Ms. Fabian,    We are concerned about your health care.  We recently provided you with a medication refill.  Many medications require routine follow-up with your Doctor.      At this time we ask that: You schedule a routine office visit with your physician to follow your care. overdue for labs and blood pressure recheck as discussed at her visit in June 2020. They should call clinic to be placed on the lab schedule prior to coming in. Medication refill sent to the pharmacy.     Your prescription: Cannot be refilled until the above noted follow up has been completed.      Thank you,      Sukh Qiu PA-C

## 2020-10-14 DIAGNOSIS — J06.9 UPPER RESPIRATORY TRACT INFECTION, UNSPECIFIED TYPE: ICD-10-CM

## 2020-10-14 RX ORDER — ALBUTEROL SULFATE 90 UG/1
AEROSOL, METERED RESPIRATORY (INHALATION)
Refills: 0 | OUTPATIENT
Start: 2020-10-14

## 2020-10-14 RX ORDER — ALBUTEROL SULFATE 90 UG/1
1-2 AEROSOL, METERED RESPIRATORY (INHALATION) EVERY 6 HOURS PRN
Qty: 6.7 G | Refills: 0 | Status: SHIPPED | OUTPATIENT
Start: 2020-10-14 | End: 2020-12-15

## 2020-10-15 NOTE — TELEPHONE ENCOUNTER
Please call patient to inform them they are overdue for labs and blood pressure recheck as discussed at her visit in June 2020. They should call clinic to be placed on the lab schedule prior to coming in. Medication refill sent to the pharmacy.     Sukh Qiu PA-C on 10/14/2020 at 7:26 PM

## 2020-12-15 ENCOUNTER — TELEPHONE (OUTPATIENT)
Dept: FAMILY MEDICINE | Facility: CLINIC | Age: 40
End: 2020-12-15

## 2020-12-15 DIAGNOSIS — J06.9 UPPER RESPIRATORY TRACT INFECTION, UNSPECIFIED TYPE: ICD-10-CM

## 2020-12-15 RX ORDER — ALBUTEROL SULFATE 90 UG/1
1-2 AEROSOL, METERED RESPIRATORY (INHALATION) EVERY 6 HOURS PRN
Qty: 1 INHALER | Refills: 0 | Status: SHIPPED | OUTPATIENT
Start: 2020-12-15 | End: 2021-11-01

## 2020-12-15 NOTE — TELEPHONE ENCOUNTER
Patient is requesting that her medication be refilled without lab work.  Patient is afraid of the virus and doesn't want to come to the clinic.    Kaur MARCUSO/

## 2020-12-16 DIAGNOSIS — J20.9 ACUTE BRONCHITIS, UNSPECIFIED ORGANISM: ICD-10-CM

## 2020-12-16 NOTE — TELEPHONE ENCOUNTER
"Routing refill request to provider for review/approval because:  Diagnosis:  Acute bronchitis, unspecified organism [J20.9]   T'd up 1 inhaler for provider review.    Requested Prescriptions   Pending Prescriptions Disp Refills     fluticasone-salmeterol (ADVAIR HFA) 115-21 MCG/ACT inhaler [Pharmacy Med Name: ADVAIR HFA 115MCG-21MCG AER] 1 g      Sig: INHALE 2 PUFFS BY MOUTH TWICE A DAY   Last Written Prescription Date:  6/30/2020  Last Fill Quantity: 1 inhaler,  # refills: 1   Last office visit: 6/30/2020   Future Office Visit:        Long-Acting Beta Agonist Inhalers Protocol  Failed - 12/16/2020  7:56 AM        Failed - Order for Serevent, Striverdi, or Foradil and pt has steroid inhaler        Passed - Patient is age 12 or older        Passed - Recent (12 mo) or future (30 days) visit within the authorizing provider's specialty     Patient has had an office visit with the authorizing provider or a provider within the authorizing providers department within the previous 12 mos or has a future within next 30 days. See \"Patient Info\" tab in inbasket, or \"Choose Columns\" in Meds & Orders section of the refill encounter.              Passed - Medication is active on med list       Inhaled Steroids Protocol Passed - 12/16/2020  7:56 AM        Passed - Patient is age 12 or older        Passed - Recent (12 mo) or future (30 days) visit within the authorizing provider's specialty     Patient has had an office visit with the authorizing provider or a provider within the authorizing providers department within the previous 12 mos or has a future within next 30 days. See \"Patient Info\" tab in inbasket, or \"Choose Columns\" in Meds & Orders section of the refill encounter.              Passed - Medication is active on med list         Mirela Conner RN      "

## 2020-12-17 RX ORDER — FLUTICASONE PROPIONATE AND SALMETEROL XINAFOATE 115; 21 UG/1; UG/1
AEROSOL, METERED RESPIRATORY (INHALATION)
Qty: 12 G | Refills: 0 | Status: SHIPPED | OUTPATIENT
Start: 2020-12-17 | End: 2021-08-31

## 2021-03-11 ENCOUNTER — VIRTUAL VISIT (OUTPATIENT)
Dept: FAMILY MEDICINE | Facility: CLINIC | Age: 41
End: 2021-03-11
Payer: COMMERCIAL

## 2021-03-11 DIAGNOSIS — R09.81 SINUS CONGESTION: Primary | ICD-10-CM

## 2021-03-11 DIAGNOSIS — F32.9 MAJOR DEPRESSIVE DISORDER WITH CURRENT ACTIVE EPISODE, UNSPECIFIED DEPRESSION EPISODE SEVERITY, UNSPECIFIED WHETHER RECURRENT: ICD-10-CM

## 2021-03-11 DIAGNOSIS — I10 ESSENTIAL HYPERTENSION: ICD-10-CM

## 2021-03-11 PROCEDURE — 99214 OFFICE O/P EST MOD 30 MIN: CPT | Mod: 95 | Performed by: PHYSICIAN ASSISTANT

## 2021-03-11 RX ORDER — SERTRALINE HYDROCHLORIDE 25 MG/1
TABLET, FILM COATED ORAL
Qty: 45 TABLET | Refills: 0 | Status: SHIPPED | OUTPATIENT
Start: 2021-03-11 | End: 2021-08-31

## 2021-03-11 RX ORDER — LISINOPRIL AND HYDROCHLOROTHIAZIDE 20; 25 MG/1; MG/1
1 TABLET ORAL DAILY
Qty: 30 TABLET | Refills: 0 | Status: SHIPPED | OUTPATIENT
Start: 2021-03-11 | End: 2021-07-10

## 2021-03-11 RX ORDER — DOXYCYCLINE 100 MG/1
100 CAPSULE ORAL 2 TIMES DAILY
Qty: 14 CAPSULE | Refills: 0 | Status: SHIPPED | OUTPATIENT
Start: 2021-03-11 | End: 2021-03-18

## 2021-03-11 ASSESSMENT — PATIENT HEALTH QUESTIONNAIRE - PHQ9: SUM OF ALL RESPONSES TO PHQ QUESTIONS 1-9: 15

## 2021-03-11 NOTE — PROGRESS NOTES
Marily is a 40 year old who is being evaluated via a billable telephone visit.      What phone number would you like to be contacted at? [phone]  How would you like to obtain your AVS? Mail a copy    Assessment & Plan     Essential hypertension  Patient ran out of medication some time ago and is motivated to restart it. Refill sent to the pharmacy. Patient will return for follow up in 1-2 months for BP recheck.   - lisinopril-hydrochlorothiazide (ZESTORETIC) 20-25 MG tablet; Take 1 tablet by mouth daily    Sinus congestion  Denies exposure to others. Will treat with doxycycline for 1 week. Patient will continue to use conservative cares. Educational information attached to AVS.   - doxycycline hyclate (VIBRAMYCIN) 100 MG capsule; Take 1 capsule (100 mg) by mouth 2 times daily for 7 days    Major depressive disorder with current active episode, unspecified depression episode severity, unspecified whether recurrent  Patient reports stressors in her life including ill uncle, ill family member of PCA, having to find a new job and concerns about her health insurance. She is open to starting an ssri. Sent to the pharmacy, follow up in 1-2 months.   - sertraline (ZOLOFT) 25 MG tablet; Take 1 tablet (25mg) by mouth daily for 2 weeks, then increase to 2 tabs (50mg) by mouth daily        Depression Screening Follow Up    PHQ 3/11/2021   PHQ-9 Total Score 15   Q9: Thoughts of better off dead/self-harm past 2 weeks Not at all            Return in about 1 month (around 4/11/2021) for Medication Recheck, BP Recheck.    Sukh Qiu PA-C  [facility]   Marily is a 40 year old who presents for the following health issues     HPI       Acute Illness  Acute illness concerns: cold symptoms  Onset/Duration: 1 1/2 weeks  Symptoms:  Fever: no  Chills/Sweats: YES  Headache (location?): no  Sinus Pressure: YES  Conjunctivitis:  no  Ear Pain: no  Rhinorrhea: YES  Congestion: YES  Sore Throat:  no  Cough: YES-productive of green sputum  Wheeze: YES  Decreased Appetite: no  Nausea: no  Vomiting: no  Diarrhea: no  Dysuria/Freq.: no  Dysuria or Hematuria: no  Fatigue/Achiness: YES  Sick/Strep Exposure: no  Therapies tried and outcome: mucinex, robitussin Claritin;    Symptoms for sinus issues listed above. Patient says that she has been feeling increased depression including crying more often due to Uncle is ill, PCA's family member is ill, had to get a new job worried and is about health insurance. Denies thoughts of wanting to hurt self or others.     Review of Systems   Constitutional, HEENT, cardiovascular, pulmonary, gi and gu systems are negative, except as otherwise noted.      Objective           Vitals:  No vitals were obtained today due to virtual visit.    Physical Exam   healthy, alert and no distress  PSYCH: Alert and oriented times 3; coherent speech, normal   rate and volume, able to articulate logical thoughts, able   to abstract reason, no tangential thoughts, no hallucinations   or delusions  Her affect is normal  RESP: No cough, no audible wheezing, able to talk in full sentences  Remainder of exam unable to be completed due to telephone visits            Phone call duration: 10 minutes

## 2021-03-11 NOTE — NURSING NOTE
Health Maintenance Due   Topic Date Due     PREVENTIVE CARE VISIT  Never done     ADVANCE CARE PLANNING  Never done     Pneumococcal Vaccine: Pediatrics (0 to 5 Years) and At-Risk Patients (6 to 64 Years) (1 of 2 - PPSV23) Never done     HIV SCREENING  Never done     HEPATITIS C SCREENING  Never done     PAP  Never done     DTAP/TDAP/TD IMMUNIZATION (1 - Tdap) Never done     INFLUENZA VACCINE (1) Never done     Chelsea CAMPOS LPN

## 2021-03-12 NOTE — PATIENT INSTRUCTIONS
Patient Education     Sinusitis (Antibiotic Treatment)    The sinuses are air-filled spaces within the bones of the face. They connect to the inside of the nose. Sinusitis is an inflammation of the tissue that lines the sinuses. Sinusitis can occur during a cold. It can also happen due to allergies to pollens and other particles in the air. Sinusitis can cause symptoms of sinus congestion and a feeling of fullness. A sinus infection causes fever, headache, and facial pain. There is often green or yellow fluid draining from the nose or into the back of the throat (post-nasal drip). You have been given antibiotics to treat this condition.   Home care    Take the full course of antibiotics as instructed. Don't stop taking them, even when you feel better.    Drink plenty of water, hot tea, and other liquids as directed by the healthcare provider. This may help thin nasal mucus. It also may help your sinuses drain fluids.    Heat may help soothe painful areas of your face. Use a towel soaked in hot water. Or,  the shower and direct the warm spray onto your face. Using a vaporizer along with a menthol rub at night may also help soothe symptoms.     An expectorant with guaifenesin may help thin nasal mucus and help your sinuses drain fluids. Talk with your provider or pharmacists before taking an over-the-counter (OTC) medicine if you have any questions about it or its side effects..    You can use an OTC decongestant, unless a similar medicine was prescribed to you. Nasal sprays work the fastest. Use one that contains phenylephrine or oxymetazoline. First blow your nose gently. Then use the spray. Don't use these medicines more often than directed on the label. If you do, your symptoms may get worse. You may also take pills that contain pseudoephedrine. Don t use products that combine multiple medicines. This is because side effects may be increased. Read labels. You can also ask the pharmacist for help. (People  with high blood pressure should not use decongestants. They can raise blood pressure.) Talk with your provider or pharmacist if you have any questions about the medicine..    OTC antihistamines may help if allergies contributed to your sinusitis. Talk with your provider or pharmacist if you have any questions about the medicine..    Don't use nasal rinses or irrigation during an acute sinus infection, unless your healthcare provider tells you to. Rinsing may spread the infection to other areas in your sinuses.    Use acetaminophen or ibuprofen to control pain, unless another pain medicine was prescribed to you. If you have chronic liver or kidney disease or ever had a stomach ulcer, talk with your healthcare provider before using these medicines. Never give aspirin to anyone under age 18 who is ill with a fever. It may cause severe liver damage.    Don't smoke. This can make symptoms worse.    Follow-up care  Follow up with your healthcare provider, or as advised.   When to seek medical advice  Call your healthcare provider if any of these occur:     Facial pain or headache that gets worse    Stiff neck    Unusual drowsiness or confusion    Swelling of your forehead or eyelids    Symptoms don't go away in 10 days    Vision problems, such as blurred or double vision    Fever of 100.4 F (38 C) or higher, or as directed by your healthcare provider  Call 911  Call 911 if any of these occur:     Seizure    Trouble breathing    Feeling dizzy or faint    Fingernails, skin or lips look blue, purple , or gray  Prevention  Here are steps you can take to help prevent an infection:     Keep good hand washing habits.    Don t have close contact with people who have sore throats, colds, or other upper respiratory infections.    Don t smoke, and stay away from secondhand smoke.    Stay up to date with of your vaccines.  [employer] last reviewed this educational content on 12/1/2019 2000-2020 [employer]. All rights  reserved. This information is not intended as a substitute for professional medical care. Always follow your healthcare professional's instructions.

## 2021-03-15 ENCOUNTER — TELEPHONE (OUTPATIENT)
Dept: FAMILY MEDICINE | Facility: CLINIC | Age: 41
End: 2021-03-15

## 2021-03-15 NOTE — TELEPHONE ENCOUNTER
Prior Authorization Retail Medication Request- [id number]    Medication/Dose: sertraline (ZOLOFT) 25 MG tablet  ICD code (if different than what is on RX):    Previously Tried and Failed:    Rationale:      Insurance Name:  Blue plus  Insurance ID:  [id number]       Pharmacy Information (if different than what is on RX)  Name:    Phone:

## 2021-03-16 NOTE — TELEPHONE ENCOUNTER
Central Prior Authorization Team   Phone: [phone]      PA Initiation    Medication: sertraline (ZOLOFT) 25 MG tablet - INITIATED  Insurance Company: [employer] Plus PMAP - Phone [phone] Fax [phone]  Pharmacy Filling the Rx: [id number] - [place], [place] - [address] [place] [address]  Filling Pharmacy Phone: [phone]  Filling Pharmacy Fax: [phone]  Start Date: 3/16/2021

## 2021-03-18 NOTE — TELEPHONE ENCOUNTER
Central Prior Authorization Team   Phone: [phone]      Prior Authorization Approval    Authorization Effective Date: 1/1/2021  Authorization Expiration Date: 4/17/2021  Medication: sertraline (ZOLOFT) 25 MG tablet - APPROVED  Approved Dose/Quantity: 60 FOR 30  Reference #:     Insurance Company: [employer] Plus PMAP - Phone [phone] Fax [phone]  Expected CoPay:       CoPay Card Available:      Foundation Assistance Needed:    Which Pharmacy is filling the prescription (Not needed for infusion/clinic administered): [id number] - [place], [place] - [address] [place] [address]  Pharmacy Notified: Yes  Patient Notified: Yes (**Instructed pharmacy to notify patient when script is ready to /ship.**)

## 2021-07-08 DIAGNOSIS — I10 ESSENTIAL HYPERTENSION: ICD-10-CM

## 2021-07-09 NOTE — TELEPHONE ENCOUNTER
Pending Prescriptions:                       Disp   Refills    lisinopril-hydrochlorothiazide (ZESTORETIC*30 tab*0        Sig: Take 1 tablet by mouth daily      Routing refill request to provider for review/approval because:  Labs out of range:  Blood pressure  Labs not current:  Potassium, sodium, creatinine    Namrata Etienne RN on 7/9/2021 at 8:37 AM

## 2021-07-10 RX ORDER — LISINOPRIL AND HYDROCHLOROTHIAZIDE 20; 25 MG/1; MG/1
1 TABLET ORAL DAILY
Qty: 30 TABLET | Refills: 0 | Status: SHIPPED | OUTPATIENT
Start: 2021-07-10 | End: 2021-08-20

## 2021-08-26 ENCOUNTER — VIRTUAL VISIT (OUTPATIENT)
Dept: FAMILY MEDICINE | Facility: CLINIC | Age: 41
End: 2021-08-26
Payer: COMMERCIAL

## 2021-08-26 DIAGNOSIS — I10 ESSENTIAL HYPERTENSION: Primary | ICD-10-CM

## 2021-08-26 PROCEDURE — 99207 PR NO CHARGE LOS: CPT | Performed by: PHYSICIAN ASSISTANT

## 2021-08-26 ASSESSMENT — PATIENT HEALTH QUESTIONNAIRE - PHQ9: SUM OF ALL RESPONSES TO PHQ QUESTIONS 1-9: 25

## 2021-08-26 NOTE — NURSING NOTE
Health Maintenance Due   Topic Date Due     PREVENTIVE CARE VISIT  Never done     ANNUAL REVIEW OF HM ORDERS  Never done     ADVANCE CARE PLANNING  Never done     DEPRESSION ACTION PLAN  Never done     Pneumococcal Vaccine: Pediatrics (0 to 5 Years) and At-Risk Patients (6 to 64 Years) (1 of 2 - PPSV23) Never done     COVID-19 Vaccine (1) Never done     HIV SCREENING  Never done     HEPATITIS C SCREENING  Never done     PAP  Never done     DTAP/TDAP/TD IMMUNIZATION (1 - Tdap) Never done     INFLUENZA VACCINE (1) 09/01/2021     PHQ-9  09/11/2021     Chelsea CAMPOS LPN

## 2021-08-26 NOTE — PROGRESS NOTES
Marily is a 41 year old who is being evaluated via a billable telephone visit.      What phone number would you like to be contacted at? [phone]  How would you like to obtain your AVS? Mail a copy    Assessment & Plan     Essential hypertension  Patient is overdue for in office visit and has agreed to reschedule to 340pm next Tuesday Aug 31.        Follow Up    Follow Up Actions Taken      Discussed the following ways the patient can remain in a safe environment:        No follow-ups on file.    ANURAG Fernández [facility]   Marily is a 41 year old who presents for the following health issues     HPI     Hypertension Follow-up      Do you check your blood pressure regularly outside of the clinic? Yes     Are you following a low salt diet? Yes    Are your blood pressures ever more than 140 on the top number (systolic) OR more   than 90 on the bottom number (diastolic), for example 140/90? Yes      How many servings of fruits and vegetables do you eat daily?  0-1    On average, how many sweetened beverages do you drink each day (Examples: soda, juice, sweet tea, etc.  Do NOT count diet or artificially sweetened beverages)?   8    How many days per week do you exercise enough to make your heart beat faster? 3 or less    How many minutes a day do you exercise enough to make your heart beat faster? 20 - 29  How many days per week do you miss taking your medication? 1    What makes it hard for you to take your medications?  remembering to take        Review of Systems         Objective           Vitals:  No vitals were obtained today due to virtual visit.    Physical Exam     PSYCH: Alert and oriented times 3; coherent speech, normal   rate and volume, able to articulate logical thoughts, able   to abstract reason, no tangential thoughts, no hallucinations   or delusions  Her affect is   RESP: No cough, no audible wheezing, able to talk in full sentences  Remainder of  exam unable to be completed due to telephone visits

## 2021-08-31 ENCOUNTER — OFFICE VISIT (OUTPATIENT)
Dept: FAMILY MEDICINE | Facility: CLINIC | Age: 41
End: 2021-08-31
Payer: COMMERCIAL

## 2021-08-31 VITALS
SYSTOLIC BLOOD PRESSURE: 124 MMHG | BODY MASS INDEX: 38.44 KG/M2 | HEART RATE: 76 BPM | TEMPERATURE: 97.3 F | WEIGHT: 203.6 LBS | HEIGHT: 61 IN | OXYGEN SATURATION: 98 % | RESPIRATION RATE: 20 BRPM | DIASTOLIC BLOOD PRESSURE: 80 MMHG

## 2021-08-31 DIAGNOSIS — R63.5 RAPID WEIGHT GAIN: ICD-10-CM

## 2021-08-31 DIAGNOSIS — R14.0 ABDOMINAL DISTENSION: ICD-10-CM

## 2021-08-31 DIAGNOSIS — F17.200 TOBACCO USE DISORDER: ICD-10-CM

## 2021-08-31 DIAGNOSIS — F32.9 MAJOR DEPRESSIVE DISORDER WITH CURRENT ACTIVE EPISODE, UNSPECIFIED DEPRESSION EPISODE SEVERITY, UNSPECIFIED WHETHER RECURRENT: ICD-10-CM

## 2021-08-31 DIAGNOSIS — Z59.00 HOMELESS: ICD-10-CM

## 2021-08-31 DIAGNOSIS — E66.01 MORBID OBESITY (H): ICD-10-CM

## 2021-08-31 DIAGNOSIS — I10 ESSENTIAL HYPERTENSION: Primary | ICD-10-CM

## 2021-08-31 LAB
ALBUMIN SERPL-MCNC: 3.9 G/DL (ref 3.4–5)
ALP SERPL-CCNC: 103 U/L (ref 40–150)
ALT SERPL W P-5'-P-CCNC: 123 U/L (ref 0–50)
ANION GAP SERPL CALCULATED.3IONS-SCNC: 5 MMOL/L (ref 3–14)
AST SERPL W P-5'-P-CCNC: 93 U/L (ref 0–45)
B-HCG SERPL-ACNC: <1 IU/L (ref 0–5)
BASOPHILS # BLD AUTO: 0 10E3/UL (ref 0–0.2)
BASOPHILS NFR BLD AUTO: 0 %
BILIRUB SERPL-MCNC: 0.4 MG/DL (ref 0.2–1.3)
BUN SERPL-MCNC: 11 MG/DL (ref 7–30)
CALCIUM SERPL-MCNC: 9.3 MG/DL (ref 8.5–10.1)
CHLORIDE BLD-SCNC: 102 MMOL/L (ref 94–109)
CO2 SERPL-SCNC: 30 MMOL/L (ref 20–32)
CREAT SERPL-MCNC: 0.63 MG/DL (ref 0.52–1.04)
EOSINOPHIL # BLD AUTO: 0.1 10E3/UL (ref 0–0.7)
EOSINOPHIL NFR BLD AUTO: 1 %
ERYTHROCYTE [DISTWIDTH] IN BLOOD BY AUTOMATED COUNT: 14.6 % (ref 10–15)
GFR SERPL CREATININE-BSD FRML MDRD: >90 ML/MIN/1.73M2
GLUCOSE BLD-MCNC: 118 MG/DL (ref 70–99)
HCG UR QL: NEGATIVE
HCT VFR BLD AUTO: 42.3 % (ref 35–47)
HGB BLD-MCNC: 14.8 G/DL (ref 11.7–15.7)
IMM GRANULOCYTES # BLD: 0.1 10E3/UL
IMM GRANULOCYTES NFR BLD: 1 %
LYMPHOCYTES # BLD AUTO: 1.9 10E3/UL (ref 0.8–5.3)
LYMPHOCYTES NFR BLD AUTO: 19 %
MCH RBC QN AUTO: 34.6 PG (ref 26.5–33)
MCHC RBC AUTO-ENTMCNC: 35 G/DL (ref 31.5–36.5)
MCV RBC AUTO: 99 FL (ref 78–100)
MONOCYTES # BLD AUTO: 0.7 10E3/UL (ref 0–1.3)
MONOCYTES NFR BLD AUTO: 7 %
NEUTROPHILS # BLD AUTO: 7.5 10E3/UL (ref 1.6–8.3)
NEUTROPHILS NFR BLD AUTO: 72 %
NRBC # BLD AUTO: 0 10E3/UL
NRBC BLD AUTO-RTO: 0 /100
PLATELET # BLD AUTO: 261 10E3/UL (ref 150–450)
POTASSIUM BLD-SCNC: 3 MMOL/L (ref 3.4–5.3)
PROT SERPL-MCNC: 8 G/DL (ref 6.8–8.8)
RBC # BLD AUTO: 4.28 10E6/UL (ref 3.8–5.2)
SODIUM SERPL-SCNC: 137 MMOL/L (ref 133–144)
TSH SERPL DL<=0.005 MIU/L-ACNC: 1.43 MU/L (ref 0.4–4)
WBC # BLD AUTO: 10.4 10E3/UL (ref 4–11)

## 2021-08-31 PROCEDURE — 81025 URINE PREGNANCY TEST: CPT | Performed by: PHYSICIAN ASSISTANT

## 2021-08-31 PROCEDURE — 36415 COLL VENOUS BLD VENIPUNCTURE: CPT | Performed by: PHYSICIAN ASSISTANT

## 2021-08-31 PROCEDURE — 80050 GENERAL HEALTH PANEL: CPT | Performed by: PHYSICIAN ASSISTANT

## 2021-08-31 PROCEDURE — 84702 CHORIONIC GONADOTROPIN TEST: CPT | Performed by: PHYSICIAN ASSISTANT

## 2021-08-31 PROCEDURE — 99214 OFFICE O/P EST MOD 30 MIN: CPT | Performed by: PHYSICIAN ASSISTANT

## 2021-08-31 RX ORDER — BUDESONIDE AND FORMOTEROL FUMARATE DIHYDRATE 160; 4.5 UG/1; UG/1
2 AEROSOL RESPIRATORY (INHALATION) 2 TIMES DAILY
Qty: 10.2 G | Refills: 3 | Status: SHIPPED | OUTPATIENT
Start: 2021-08-31 | End: 2022-03-22

## 2021-08-31 RX ORDER — SERTRALINE HYDROCHLORIDE 25 MG/1
25 TABLET, FILM COATED ORAL DAILY
Qty: 30 TABLET | Refills: 1 | Status: SHIPPED | OUTPATIENT
Start: 2021-08-31

## 2021-08-31 RX ORDER — LISINOPRIL AND HYDROCHLOROTHIAZIDE 20; 25 MG/1; MG/1
1 TABLET ORAL DAILY
Qty: 90 TABLET | Refills: 3 | Status: SHIPPED | OUTPATIENT
Start: 2021-08-31

## 2021-08-31 SDOH — ECONOMIC STABILITY - HOUSING INSECURITY: HOMELESSNESS UNSPECIFIED: Z59.00

## 2021-08-31 ASSESSMENT — MIFFLIN-ST. JEOR: SCORE: 1517.96

## 2021-08-31 ASSESSMENT — PAIN SCALES - GENERAL: PAINLEVEL: NO PAIN (0)

## 2021-08-31 NOTE — NURSING NOTE
Health Maintenance Due   Topic Date Due     PREVENTIVE CARE VISIT  Never done     ANNUAL REVIEW OF HM ORDERS  Never done     ADVANCE CARE PLANNING  Never done     DEPRESSION ACTION PLAN  Never done     Pneumococcal Vaccine: Pediatrics (0 to 5 Years) and At-Risk Patients (6 to 64 Years) (1 of 2 - PPSV23) Never done     COVID-19 Vaccine (1) Never done     HIV SCREENING  Never done     HEPATITIS C SCREENING  Never done     PAP  Never done     DTAP/TDAP/TD IMMUNIZATION (1 - Tdap) Never done     INFLUENZA VACCINE (1) 09/01/2021     Chelsea CAMPOS LPN
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
## 2021-08-31 NOTE — PATIENT INSTRUCTIONS
1. Concern about rapid weight gain    2. History of sexual activity without protection    3. Home pregnancy kits - negative    4. Check hcg (pregnancy hormone levels)     5. If these return negative - abdominal xray    6. Pending this may consider additional workup     7. Follow up in 1 month to re-evaluate anxiety since you are restarting the sertraline

## 2021-09-01 DIAGNOSIS — E87.6 HYPOKALEMIA: ICD-10-CM

## 2021-09-01 DIAGNOSIS — R14.0 ABDOMINAL DISTENSION: Primary | ICD-10-CM

## 2021-09-01 DIAGNOSIS — R74.8 ELEVATED LIVER ENZYMES: ICD-10-CM

## 2021-09-01 PROBLEM — Z59.00 HOMELESS: Status: ACTIVE | Noted: 2021-09-01

## 2021-09-01 PROBLEM — E66.01 MORBID OBESITY (H): Status: ACTIVE | Noted: 2021-09-01

## 2021-09-01 PROBLEM — I10 ESSENTIAL HYPERTENSION: Status: ACTIVE | Noted: 2021-09-01

## 2021-09-01 PROBLEM — F32.9 MAJOR DEPRESSIVE DISORDER WITH CURRENT ACTIVE EPISODE, UNSPECIFIED DEPRESSION EPISODE SEVERITY, UNSPECIFIED WHETHER RECURRENT: Status: ACTIVE | Noted: 2021-09-01

## 2021-09-01 RX ORDER — POTASSIUM CHLORIDE 1500 MG/1
TABLET, EXTENDED RELEASE ORAL
Qty: 40 TABLET | Refills: 0 | Status: SHIPPED | OUTPATIENT
Start: 2021-09-01 | End: 2021-11-01

## 2021-10-01 DIAGNOSIS — E87.6 HYPOKALEMIA: ICD-10-CM

## 2021-10-05 NOTE — TELEPHONE ENCOUNTER
Pending Prescriptions:                       Disp   Refills    potassium chloride ER (KLOR-CON M) 20 MEQ *40 tab*0        Sig: TAKE 1 TABLET (20MG) TWICE DAILY FOR 1 WEEK, THEN 1           TABLET (20MG) DAILY    Routing refill request to provider for review/approval because:  Drug not active on patient's medication list  Labs out of range:    Potassium   Date Value Ref Range Status   08/31/2021 3.0 (L) 3.4 - 5.3 mmol/L Final   05/11/2020 3.4 3.4 - 5.3 mmol/L Final

## 2021-10-06 DIAGNOSIS — E87.6 HYPOKALEMIA: Primary | ICD-10-CM

## 2021-10-06 RX ORDER — POTASSIUM CHLORIDE 1500 MG/1
TABLET, EXTENDED RELEASE ORAL
Qty: 40 TABLET | Refills: 0 | OUTPATIENT
Start: 2021-10-06

## 2021-10-06 NOTE — TELEPHONE ENCOUNTER
Please call patient to inform them they are overdue for labs. I have placed a future order for this to be done anytime in the next month. They should call clinic to be placed on the lab schedule prior to coming in.     Sukh Qiu PA-C on 10/6/2021 at 5:47 PM

## 2021-10-12 ENCOUNTER — HOSPITAL ENCOUNTER (OUTPATIENT)
Dept: ULTRASOUND IMAGING | Facility: CLINIC | Age: 41
Discharge: HOME | End: 2021-10-12
Attending: PHYSICIAN ASSISTANT | Admitting: PHYSICIAN ASSISTANT
Payer: COMMERCIAL

## 2021-10-12 DIAGNOSIS — R16.1 SPLENOMEGALY: ICD-10-CM

## 2021-10-12 DIAGNOSIS — R74.8 ELEVATED LIVER ENZYMES: Primary | ICD-10-CM

## 2021-10-12 DIAGNOSIS — R74.8 ELEVATED LIVER ENZYMES: ICD-10-CM

## 2021-10-12 DIAGNOSIS — R14.0 ABDOMINAL DISTENSION: ICD-10-CM

## 2021-10-12 PROCEDURE — 76700 US EXAM ABDOM COMPLETE: CPT

## 2021-10-18 ENCOUNTER — MYC MEDICAL ADVICE (OUTPATIENT)
Dept: FAMILY MEDICINE | Facility: CLINIC | Age: 41
End: 2021-10-18

## 2021-10-19 ENCOUNTER — E-VISIT (OUTPATIENT)
Dept: FAMILY MEDICINE | Facility: CLINIC | Age: 41
End: 2021-10-19
Payer: COMMERCIAL

## 2021-10-19 DIAGNOSIS — N89.8 VAGINAL DISCHARGE: Primary | ICD-10-CM

## 2021-10-19 PROCEDURE — 99421 OL DIG E/M SVC 5-10 MIN: CPT | Performed by: PHYSICIAN ASSISTANT

## 2021-10-19 RX ORDER — FLUCONAZOLE 150 MG/1
150 TABLET ORAL ONCE
Qty: 2 TABLET | Refills: 0 | Status: SHIPPED | OUTPATIENT
Start: 2021-10-19 | End: 2021-10-19

## 2021-10-19 NOTE — TELEPHONE ENCOUNTER
It does not look as if the patient has been seen in the past 1.5 months. Yes, an evisit is advised prior to starting a new treatment.     Sukh Qiu PA-C on 10/18/2021 at 8:05 PM

## 2021-10-23 ENCOUNTER — HEALTH MAINTENANCE LETTER (OUTPATIENT)
Age: 41
End: 2021-10-23

## 2021-10-27 ENCOUNTER — MYC MEDICAL ADVICE (OUTPATIENT)
Dept: FAMILY MEDICINE | Facility: CLINIC | Age: 41
End: 2021-10-27

## 2021-10-27 NOTE — TELEPHONE ENCOUNTER
RECORDS RECEIVED FROM: Internal   Appt Date: 11.01.2021   NOTES STATUS DETAILS   OFFICE NOTE from referring provider Internal 10.12.2021 Sukh Qiu PA-C   OFFICE NOTES from other specialists N/A    DISCHARGE SUMMARY from hospital N/A    MEDICATION LIST Internal    LIVER BIOSPY (IF APPLICABLE)      PATHOLOGY REPORTS  N/A    IMAGING     ENDOSCOPY (IF AVAILABLE) N/A    COLONOSCOPY (IF AVAILABLE) N/A    ULTRASOUND LIVER Internal 10.12.2021 US ABDOMEN COMPLETE    CT OF ABDOMEN N/A    MRI OF LIVER N/A    FIBROSCAN, US ELASTOGRAPHY, FIBROSIS SCAN, MR ELASTOGRAPHY N/A    LABS     HEPATIC PANEL (LIVER PANEL) N/A    BASIC METABOLIC PANEL N/A    COMPLETE METABOLIC PANEL Internal 08.31.2021   COMPLETE BLOOD COUNT (CBC) Internal 08.31.2021   INTERNATIONAL NORMALIZED RATIO (INR) N/A    HEPATITIS C ANTIBODY N/A    HEPATITIS C VIRAL LOAD/PCR N/A    HEPATITIS C GENOTYPE N/A    HEPATITIS B SURFACE ANTIGEN N/A    HEPATITIS B SURFACE ANTIBODY N/A    HEPATITIS B DNA QUANT LEVEL N/A    HEPATITIS B CORE ANTIBODY N/A

## 2021-10-29 ENCOUNTER — MYC MEDICAL ADVICE (OUTPATIENT)
Dept: FAMILY MEDICINE | Facility: CLINIC | Age: 41
End: 2021-10-29

## 2021-10-29 ENCOUNTER — LAB (OUTPATIENT)
Dept: LAB | Facility: CLINIC | Age: 41
End: 2021-10-29
Payer: COMMERCIAL

## 2021-10-29 DIAGNOSIS — R74.8 ELEVATED LIVER ENZYMES: ICD-10-CM

## 2021-10-29 DIAGNOSIS — E87.6 HYPOKALEMIA: ICD-10-CM

## 2021-10-29 DIAGNOSIS — R16.1 SPLENOMEGALY: ICD-10-CM

## 2021-10-29 LAB — POTASSIUM BLD-SCNC: 4 MMOL/L (ref 3.4–5.3)

## 2021-10-29 PROCEDURE — 84132 ASSAY OF SERUM POTASSIUM: CPT

## 2021-10-29 PROCEDURE — 36415 COLL VENOUS BLD VENIPUNCTURE: CPT

## 2021-10-29 PROCEDURE — 86803 HEPATITIS C AB TEST: CPT

## 2021-11-01 ENCOUNTER — OFFICE VISIT (OUTPATIENT)
Dept: GASTROENTEROLOGY | Facility: CLINIC | Age: 41
End: 2021-11-01
Attending: PHYSICIAN ASSISTANT
Payer: COMMERCIAL

## 2021-11-01 ENCOUNTER — LAB (OUTPATIENT)
Dept: LAB | Facility: CLINIC | Age: 41
End: 2021-11-01
Attending: INTERNAL MEDICINE
Payer: COMMERCIAL

## 2021-11-01 ENCOUNTER — PRE VISIT (OUTPATIENT)
Dept: GASTROENTEROLOGY | Facility: CLINIC | Age: 41
End: 2021-11-01

## 2021-11-01 VITALS
HEART RATE: 106 BPM | SYSTOLIC BLOOD PRESSURE: 145 MMHG | WEIGHT: 207.6 LBS | OXYGEN SATURATION: 97 % | DIASTOLIC BLOOD PRESSURE: 108 MMHG | BODY MASS INDEX: 39.88 KG/M2

## 2021-11-01 DIAGNOSIS — R16.1 SPLENOMEGALY: ICD-10-CM

## 2021-11-01 DIAGNOSIS — R74.8 ELEVATED LIVER ENZYMES: ICD-10-CM

## 2021-11-01 LAB
ALBUMIN SERPL-MCNC: 3.7 G/DL (ref 3.4–5)
ALP SERPL-CCNC: 96 U/L (ref 40–150)
ALT SERPL W P-5'-P-CCNC: 80 U/L (ref 0–50)
ANION GAP SERPL CALCULATED.3IONS-SCNC: 4 MMOL/L (ref 3–14)
AST SERPL W P-5'-P-CCNC: 48 U/L (ref 0–45)
BILIRUB DIRECT SERPL-MCNC: 0.1 MG/DL (ref 0–0.2)
BILIRUB SERPL-MCNC: 0.4 MG/DL (ref 0.2–1.3)
BUN SERPL-MCNC: 12 MG/DL (ref 7–30)
CALCIUM SERPL-MCNC: 9.2 MG/DL (ref 8.5–10.1)
CHLORIDE BLD-SCNC: 104 MMOL/L (ref 94–109)
CO2 SERPL-SCNC: 27 MMOL/L (ref 20–32)
CREAT SERPL-MCNC: 0.6 MG/DL (ref 0.52–1.04)
ERYTHROCYTE [DISTWIDTH] IN BLOOD BY AUTOMATED COUNT: 14.6 % (ref 10–15)
FERRITIN SERPL-MCNC: 91 NG/ML (ref 12–150)
GFR SERPL CREATININE-BSD FRML MDRD: >90 ML/MIN/1.73M2
GLUCOSE BLD-MCNC: 92 MG/DL (ref 70–99)
HCT VFR BLD AUTO: 41.1 % (ref 35–47)
HCV AB SERPL QL IA: NONREACTIVE
HGB BLD-MCNC: 14.3 G/DL (ref 11.7–15.7)
INR PPP: 0.97 (ref 0.85–1.15)
IRON SATN MFR SERPL: 15 % (ref 15–46)
IRON SERPL-MCNC: 62 UG/DL (ref 35–180)
MCH RBC QN AUTO: 34.3 PG (ref 26.5–33)
MCHC RBC AUTO-ENTMCNC: 34.8 G/DL (ref 31.5–36.5)
MCV RBC AUTO: 99 FL (ref 78–100)
PLATELET # BLD AUTO: 305 10E3/UL (ref 150–450)
POTASSIUM BLD-SCNC: 3.9 MMOL/L (ref 3.4–5.3)
PROT SERPL-MCNC: 7.6 G/DL (ref 6.8–8.8)
RBC # BLD AUTO: 4.17 10E6/UL (ref 3.8–5.2)
SODIUM SERPL-SCNC: 135 MMOL/L (ref 133–144)
TIBC SERPL-MCNC: 418 UG/DL (ref 240–430)
WBC # BLD AUTO: 11.7 10E3/UL (ref 4–11)

## 2021-11-01 PROCEDURE — 85027 COMPLETE CBC AUTOMATED: CPT | Performed by: PATHOLOGY

## 2021-11-01 PROCEDURE — 82728 ASSAY OF FERRITIN: CPT | Performed by: PATHOLOGY

## 2021-11-01 PROCEDURE — 80053 COMPREHEN METABOLIC PANEL: CPT | Performed by: PATHOLOGY

## 2021-11-01 PROCEDURE — 87340 HEPATITIS B SURFACE AG IA: CPT | Performed by: INTERNAL MEDICINE

## 2021-11-01 PROCEDURE — 86704 HEP B CORE ANTIBODY TOTAL: CPT | Performed by: INTERNAL MEDICINE

## 2021-11-01 PROCEDURE — 36415 COLL VENOUS BLD VENIPUNCTURE: CPT | Performed by: PATHOLOGY

## 2021-11-01 PROCEDURE — 86708 HEPATITIS A ANTIBODY: CPT | Performed by: INTERNAL MEDICINE

## 2021-11-01 PROCEDURE — 86706 HEP B SURFACE ANTIBODY: CPT | Performed by: INTERNAL MEDICINE

## 2021-11-01 PROCEDURE — 83516 IMMUNOASSAY NONANTIBODY: CPT | Mod: 59 | Performed by: INTERNAL MEDICINE

## 2021-11-01 PROCEDURE — 99204 OFFICE O/P NEW MOD 45 MIN: CPT | Performed by: INTERNAL MEDICINE

## 2021-11-01 PROCEDURE — 83550 IRON BINDING TEST: CPT | Performed by: PATHOLOGY

## 2021-11-01 PROCEDURE — 85610 PROTHROMBIN TIME: CPT | Performed by: PATHOLOGY

## 2021-11-01 PROCEDURE — 82248 BILIRUBIN DIRECT: CPT | Performed by: PATHOLOGY

## 2021-11-01 ASSESSMENT — PAIN SCALES - GENERAL: PAINLEVEL: NO PAIN (0)

## 2021-11-01 NOTE — PROGRESS NOTES
[facility]    Hepatology New Patient Visit    Referring provider:  Skuh SOLITARIO Lazarus      41 year old female    Chief complaint:  Abnormal liver function tests    HPI:  The patient presents to clinic for further evaluation and management of abnormal liver function tests.  Liver function tests were last checked in 05/2020 and were normal.  At that time, the patient had presented to her local ER with suicidal ideation and alcohol intoxication.  Hepatitis C antibody has been obtained and is pending.  Abdominal ultrasound in 10/2021 showed hepatomegaly and increased echogenicity of the liver.    The patient is well today.  She denies any symptoms specific to liver disease.      She does note that she was experiencing lower extremity edema in 08/2021 when she initially presented, but this has since resolved.      The patient denies any abdominal distention, jaundice, lethargy or confusion.    No history of melena, hematemesis or hematochezia.    The patient denies fever, sweats or chills.    Weight has increased 70 pounds since 05/2020.  Appetite is good.    The patient last drank alcohol last night.  She is currently drinking 1 pint of vodka per day, although she has cut this back significantly from when she was drinking a Traveller of vodka (?) every day.  She has been drinking this heavily for the past 1-1/2 years.  The patient denies any other alcohol-related problems with her health.  She has been to AA and rehab in the past, most recently in 2019.  She has never attended one-on-one counseling for her alcohol use disorder.    The patient has had prior issues with methamphetamine use in 2015.  She also attended rehab for this.  She denies any intranasal or marijuana use.    The patient is a current smoker.  She has been smoking for 25 years, at least 1 pack of cigarettes per day.      Medical hx Surgical hx   Past Medical History:   Diagnosis Date     Anxiety      COPD (chronic obstructive  pulmonary disease) (H)      Depressive disorder      History of alcohol use disorder      HTN (hypertension)      Polysubstance abuse (H)      Smoker       Past Surgical History:   Procedure Laterality Date     C APPENDECTOMY  06/2000     C DRAIN OVARIAN ABSCESS,ABD APPRCH  03/23/2001     CONIZATION CERVIX,LOOP ELECTRD  03/23/2001     TUBAL LIGATION            Medications  Prior to Admission medications    Medication Sig Start Date End Date Taking? Authorizing Provider   budesonide-formoterol (SYMBICORT) 160-4.5 MCG/ACT Inhaler Inhale 2 puffs into the lungs 2 times daily 8/31/21   Sukh Qiu PA-C   lisinopril-hydrochlorothiazide (ZESTORETIC) 20-25 MG tablet Take 1 tablet by mouth daily 8/31/21   Skuh Qiu PA-C   sertraline (ZOLOFT) 25 MG tablet Take 1 tablet (25 mg) by mouth daily 8/31/21   Sukh Qiu PA-C       Allergies  Allergies   Allergen Reactions     Clarithromycin      biaxin       Family hx Social hx   Family History   Problem Relation Age of Onset     Hypertension Mother      Depression Mother      Alcoholism Mother      Unknown/Adopted Father      Depression Sister      Depression Sister      Depression Sister      Attention Deficit Disorder Brother      Unknown/Adopted Maternal Grandmother      Cancer Maternal Grandmother         lung cancer     Unknown/Adopted Maternal Grandfather      Hypertension Maternal Grandfather      Cancer Paternal Grandmother      Diabetes Paternal Grandmother         brain and lung cancer     Alcoholism Paternal Grandmother      Unknown/Adopted Paternal Grandfather      Depression Daughter      Attention Deficit Disorder Son      Depression Son      Colon Cancer No family hx of       Social History     Tobacco Use     Smoking status: Current Every Day Smoker     Packs/day: 0.50     Years: 24.00     Pack years: 12.00     Types: Cigarettes     Smokeless tobacco: Never Used   Substance Use Topics     Alcohol use: Yes     Comment: weekends      Drug use:  Never     Lives in [place] with partner.  Unstable home situation.  3 adult children, all healthy.  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     Review of systems  A 10-point review of systems was negative.    Examination  BP (!) 145/108   Pulse 106   Wt 94.2 kg (207 lb 9.6 oz)   SpO2 97%   BMI 39.88 kg/m    Body mass index is 39.88 kg/m .    Gen- well, NAD, A+Ox3, normal color  Eye- EOMI, no scleral icterus  ENT- MMM, normal oropharynx  Lym- no palpable lymphadenopathy  CVS- S1, S2 normal, no added sounds, RRR  RS- CTA  Abd- obese, striae, soft, non-tender, hepatomegaly on palpation and percussion, no ascites or splenomegaly on palpation or percussion, BS+  Extr- pulses good, no NADINE  MS- hands normal- no clubbing  Neuro- A+Ox3, no asterixis  Skin- no rash or jaundice  Psych- normal mood    Laboratory  Lab Results   Component Value Date     08/31/2021     05/11/2020    POTASSIUM 4.0 10/29/2021    POTASSIUM 3.4 05/11/2020    CHLORIDE 102 08/31/2021    CHLORIDE 105 05/11/2020    CO2 30 08/31/2021    CO2 24 05/11/2020    BUN 11 08/31/2021    BUN 19 05/11/2020    CR 0.63 08/31/2021    CR 0.61 05/11/2020       Lab Results   Component Value Date    BILITOTAL 0.4 08/31/2021    BILITOTAL 0.9 05/11/2020     08/31/2021    ALT 33 05/11/2020    AST 93 08/31/2021    AST 54 05/11/2020    ALKPHOS 103 08/31/2021    ALKPHOS 72 05/11/2020       Lab Results   Component Value Date    ALBUMIN 3.9 08/31/2021    ALBUMIN 3.6 05/11/2020    PROTTOTAL 8.0 08/31/2021    PROTTOTAL 7.5 05/11/2020        Lab Results   Component Value Date    WBC 10.4 08/31/2021    WBC 9.1 05/11/2020    HGB 14.8 08/31/2021    HGB 16.6 05/11/2020    MCV 99 08/31/2021    MCV 89 05/11/2020     08/31/2021     05/11/2020     HCV screen (P)    Radiology  Abd U/S Oct 2021 reviewed    Assessment  41-year-old female with history of polysubstance use disorder who presents for further evaluation and management of abnormal liver function tests secondary to  alcoholic liver disease with a component of metabolic/non-alcoholic fatty liver disease.  No clinical, biochemical or radiographic evidence of cirrhosis.  Will rule out other etiologies of chronic liver disease including viral hepatitis and hemochromatosis.  We discussed the importance of complete abstinence from alcohol with regard to improvement and preservation of current liver function.  We discussed engaging again in inpatient or intensive outpatient treatment for alcohol use disorder, but the patient is not interested in this at this time.    Plan  1.  Complete abstinence from alcohol  2.  Check CMP, INR, CBC  3.  Check HAV Ab, HBV SAg, HBV SAb, HBV CAb  4.  Check TTG Ab, iron studies  5.  Follow-up with PCP (if above negative or normal)    Darin Nagel MD  Hepatology  [facility]

## 2021-11-01 NOTE — LETTER
11/1/2021     RE: Marily Fabian  [address] [facility] [address]    Dear Colleague,    Thank you for referring your patient, Marily Fabian, to the [facility] [place]. Please see a copy of my visit note below.    [facility]    Hepatology New Patient Visit    Referring provider:  Sukh Qiu      41 year old female    Chief complaint:  Abnormal liver function tests    HPI:  The patient presents to clinic for further evaluation and management of abnormal liver function tests.  Liver function tests were last checked in 05/2020 and were normal.  At that time, the patient had presented to her local ER with suicidal ideation and alcohol intoxication.  Hepatitis C antibody has been obtained and is pending.  Abdominal ultrasound in 10/2021 showed hepatomegaly and increased echogenicity of the liver.    The patient is well today.  She denies any symptoms specific to liver disease.      She does note that she was experiencing lower extremity edema in 08/2021 when she initially presented, but this has since resolved.      The patient denies any abdominal distention, jaundice, lethargy or confusion.    No history of melena, hematemesis or hematochezia.    The patient denies fever, sweats or chills.    Weight has increased 70 pounds since 05/2020.  Appetite is good.    The patient last drank alcohol last night.  She is currently drinking 1 pint of vodka per day, although she has cut this back significantly from when she was drinking a Traveller of vodka (?) every day.  She has been drinking this heavily for the past 1-1/2 years.  The patient denies any other alcohol-related problems with her health.  She has been to AA and rehab in the past, most recently in 2019.  She has never attended one-on-one counseling for her alcohol use disorder.    The patient has had prior issues with methamphetamine use in 2015.  She also attended rehab for this.  She denies any  intranasal or marijuana use.    The patient is a current smoker.  She has been smoking for 25 years, at least 1 pack of cigarettes per day.      Medical hx Surgical hx   Past Medical History:   Diagnosis Date     Anxiety      COPD (chronic obstructive pulmonary disease) (H)      Depressive disorder      History of alcohol use disorder      HTN (hypertension)      Polysubstance abuse (H)      Smoker       Past Surgical History:   Procedure Laterality Date     C APPENDECTOMY  06/2000     C DRAIN OVARIAN ABSCESS,ABD APPRCH  03/23/2001     CONIZATION CERVIX,LOOP ELECTRD  03/23/2001     TUBAL LIGATION            Medications  Prior to Admission medications    Medication Sig Start Date End Date Taking? Authorizing Provider   budesonide-formoterol (SYMBICORT) 160-4.5 MCG/ACT Inhaler Inhale 2 puffs into the lungs 2 times daily 8/31/21   Sukh Qiu PA-C   lisinopril-hydrochlorothiazide (ZESTORETIC) 20-25 MG tablet Take 1 tablet by mouth daily 8/31/21   Sukh Qiu PA-C   sertraline (ZOLOFT) 25 MG tablet Take 1 tablet (25 mg) by mouth daily 8/31/21   Sukh Qiu PA-C       Allergies  Allergies   Allergen Reactions     Clarithromycin      biaxin       Family hx Social hx   Family History   Problem Relation Age of Onset     Hypertension Mother      Depression Mother      Alcoholism Mother      Unknown/Adopted Father      Depression Sister      Depression Sister      Depression Sister      Attention Deficit Disorder Brother      Unknown/Adopted Maternal Grandmother      Cancer Maternal Grandmother         lung cancer     Unknown/Adopted Maternal Grandfather      Hypertension Maternal Grandfather      Cancer Paternal Grandmother      Diabetes Paternal Grandmother         brain and lung cancer     Alcoholism Paternal Grandmother      Unknown/Adopted Paternal Grandfather      Depression Daughter      Attention Deficit Disorder Son      Depression Son      Colon Cancer No family hx of       Social History      Tobacco Use     Smoking status: Current Every Day Smoker     Packs/day: 0.50     Years: 24.00     Pack years: 12.00     Types: Cigarettes     Smokeless tobacco: Never Used   Substance Use Topics     Alcohol use: Yes     Comment: weekends      Drug use: Never     Lives in [place] with partner.  Unstable home situation.  3 adult children, all healthy.  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     Review of systems  A 10-point review of systems was negative.    Examination  BP (!) 145/108   Pulse 106   Wt 94.2 kg (207 lb 9.6 oz)   SpO2 97%   BMI 39.88 kg/m    Body mass index is 39.88 kg/m .    Gen- well, NAD, A+Ox3, normal color  Eye- EOMI, no scleral icterus  ENT- MMM, normal oropharynx  Lym- no palpable lymphadenopathy  CVS- S1, S2 normal, no added sounds, RRR  RS- CTA  Abd- obese, striae, soft, non-tender, hepatomegaly on palpation and percussion, no ascites or splenomegaly on palpation or percussion, BS+  Extr- pulses good, no NADINE  MS- hands normal- no clubbing  Neuro- A+Ox3, no asterixis  Skin- no rash or jaundice  Psych- normal mood    Laboratory  Lab Results   Component Value Date     08/31/2021     05/11/2020    POTASSIUM 4.0 10/29/2021    POTASSIUM 3.4 05/11/2020    CHLORIDE 102 08/31/2021    CHLORIDE 105 05/11/2020    CO2 30 08/31/2021    CO2 24 05/11/2020    BUN 11 08/31/2021    BUN 19 05/11/2020    CR 0.63 08/31/2021    CR 0.61 05/11/2020       Lab Results   Component Value Date    BILITOTAL 0.4 08/31/2021    BILITOTAL 0.9 05/11/2020     08/31/2021    ALT 33 05/11/2020    AST 93 08/31/2021    AST 54 05/11/2020    ALKPHOS 103 08/31/2021    ALKPHOS 72 05/11/2020       Lab Results   Component Value Date    ALBUMIN 3.9 08/31/2021    ALBUMIN 3.6 05/11/2020    PROTTOTAL 8.0 08/31/2021    PROTTOTAL 7.5 05/11/2020        Lab Results   Component Value Date    WBC 10.4 08/31/2021    WBC 9.1 05/11/2020    HGB 14.8 08/31/2021    HGB 16.6 05/11/2020    MCV 99 08/31/2021    MCV 89 05/11/2020     08/31/2021      05/11/2020     HCV screen (P)    Radiology  Abd U/S Oct 2021 reviewed    Assessment  41-year-old female with history of polysubstance use disorder who presents for further evaluation and management of abnormal liver function tests secondary to alcoholic liver disease with a component of metabolic/non-alcoholic fatty liver disease.  No clinical, biochemical or radiographic evidence of cirrhosis.  Will rule out other etiologies of chronic liver disease including viral hepatitis and hemochromatosis.  We discussed the importance of complete abstinence from alcohol with regard to improvement and preservation of current liver function.  We discussed engaging again in inpatient or intensive outpatient treatment for alcohol use disorder, but the patient is not interested in this at this time.    Plan  1.  Complete abstinence from alcohol  2.  Check CMP, INR, CBC  3.  Check HAV Ab, HBV SAg, HBV SAb, HBV CAb  4.  Check TTG Ab, iron studies  5.  Follow-up with PCP (if above negative or normal)    Darin Nagel MD  Hepatology  [facility]

## 2021-11-02 LAB
HAV IGG SER QL IA: NONREACTIVE
HBV CORE AB SERPL QL IA: NONREACTIVE
HBV SURFACE AB SERPL IA-ACNC: 1.26 M[IU]/ML
HBV SURFACE AG SERPL QL IA: NONREACTIVE
TTG IGA SER-ACNC: 0.3 U/ML
TTG IGG SER-ACNC: 0.7 U/ML

## 2021-11-03 ENCOUNTER — MYC MEDICAL ADVICE (OUTPATIENT)
Dept: FAMILY MEDICINE | Facility: CLINIC | Age: 41
End: 2021-11-03

## 2021-11-04 ENCOUNTER — MYC MEDICAL ADVICE (OUTPATIENT)
Dept: FAMILY MEDICINE | Facility: CLINIC | Age: 41
End: 2021-11-04

## 2022-03-18 DIAGNOSIS — F17.200 TOBACCO USE DISORDER: ICD-10-CM

## 2022-03-22 RX ORDER — BUDESONIDE AND FORMOTEROL FUMARATE DIHYDRATE 160; 4.5 UG/1; UG/1
AEROSOL RESPIRATORY (INHALATION)
Qty: 10.2 G | Refills: 1 | Status: SHIPPED | OUTPATIENT
Start: 2022-03-22

## 2022-10-09 ENCOUNTER — HEALTH MAINTENANCE LETTER (OUTPATIENT)
Age: 42
End: 2022-10-09

## 2022-11-26 ENCOUNTER — HEALTH MAINTENANCE LETTER (OUTPATIENT)
Age: 42
End: 2022-11-26

## 2024-01-06 ENCOUNTER — HEALTH MAINTENANCE LETTER (OUTPATIENT)
Age: 44
End: 2024-01-06

## 2024-03-16 ENCOUNTER — HEALTH MAINTENANCE LETTER (OUTPATIENT)
Age: 44
End: 2024-03-16